# Patient Record
Sex: FEMALE | Race: ASIAN | NOT HISPANIC OR LATINO | Employment: FULL TIME | URBAN - METROPOLITAN AREA
[De-identification: names, ages, dates, MRNs, and addresses within clinical notes are randomized per-mention and may not be internally consistent; named-entity substitution may affect disease eponyms.]

---

## 2017-06-01 ENCOUNTER — OFFICE VISIT - HEALTHEAST (OUTPATIENT)
Dept: FAMILY MEDICINE | Facility: CLINIC | Age: 27
End: 2017-06-01

## 2017-06-01 DIAGNOSIS — F41.9 ANXIETY: ICD-10-CM

## 2017-06-01 DIAGNOSIS — Z00.00 HEALTHY ADULT ON ROUTINE PHYSICAL EXAMINATION: ICD-10-CM

## 2017-06-01 ASSESSMENT — MIFFLIN-ST. JEOR: SCORE: 1232.31

## 2017-06-02 ENCOUNTER — COMMUNICATION - HEALTHEAST (OUTPATIENT)
Dept: FAMILY MEDICINE | Facility: CLINIC | Age: 27
End: 2017-06-02

## 2017-06-12 LAB
BKR LAB AP ABNORMAL BLEEDING: NO
BKR LAB AP BIRTH CONTROL/HORMONES: ABNORMAL
BKR LAB AP CERVICAL APPEARANCE: NORMAL
BKR LAB AP GYN ADEQUACY: ABNORMAL
BKR LAB AP GYN INTERPRETATION: ABNORMAL
BKR LAB AP GYN OTHER FINDINGS: ABNORMAL
BKR LAB AP HPV REFLEX: ABNORMAL
BKR LAB AP LMP: ABNORMAL
BKR LAB AP PATIENT STATUS: ABNORMAL
BKR LAB AP PREVIOUS ABNORMAL: ABNORMAL
BKR LAB AP PREVIOUS NORMAL: ABNORMAL
HIGH RISK?: NO
PATH REPORT.COMMENTS IMP SPEC: ABNORMAL
RESULT FLAG (HE HISTORICAL CONVERSION): ABNORMAL

## 2017-06-15 LAB
HPV INTERPRETATION - HISTORICAL: ABNORMAL
HPV INTERPRETER - HISTORICAL: ABNORMAL

## 2017-06-21 ENCOUNTER — OFFICE VISIT - HEALTHEAST (OUTPATIENT)
Dept: FAMILY MEDICINE | Facility: CLINIC | Age: 27
End: 2017-06-21

## 2017-06-21 DIAGNOSIS — Z12.4 CERVICAL CANCER SCREENING: ICD-10-CM

## 2017-06-21 DIAGNOSIS — Z30.09 ENCOUNTER FOR OTHER GENERAL COUNSELING OR ADVICE ON CONTRACEPTION: ICD-10-CM

## 2017-06-21 DIAGNOSIS — R87.618 OTHER ABNORMAL CYTOLOGICAL FINDING OF SPECIMEN FROM CERVIX: ICD-10-CM

## 2017-06-21 ASSESSMENT — MIFFLIN-ST. JEOR: SCORE: 1224.37

## 2017-06-23 LAB
LAB AP CHARGES (HE HISTORICAL CONVERSION): NORMAL
PATH REPORT.COMMENTS IMP SPEC: NORMAL
PATH REPORT.COMMENTS IMP SPEC: NORMAL
PATH REPORT.FINAL DX SPEC: NORMAL
PATH REPORT.GROSS SPEC: NORMAL
PATH REPORT.MICROSCOPIC SPEC OTHER STN: NORMAL
PATH REPORT.RELEVANT HX SPEC: NORMAL
RESULT FLAG (HE HISTORICAL CONVERSION): NORMAL

## 2017-06-26 ENCOUNTER — COMMUNICATION - HEALTHEAST (OUTPATIENT)
Dept: ADMINISTRATIVE | Facility: CLINIC | Age: 27
End: 2017-06-26

## 2018-01-18 ENCOUNTER — COMMUNICATION - HEALTHEAST (OUTPATIENT)
Dept: FAMILY MEDICINE | Facility: CLINIC | Age: 28
End: 2018-01-18

## 2018-01-19 ENCOUNTER — OFFICE VISIT - HEALTHEAST (OUTPATIENT)
Dept: FAMILY MEDICINE | Facility: CLINIC | Age: 28
End: 2018-01-19

## 2018-01-19 DIAGNOSIS — Z3A.01 LESS THAN 8 WEEKS GESTATION OF PREGNANCY: ICD-10-CM

## 2018-01-19 DIAGNOSIS — Z23 NEED FOR IMMUNIZATION AGAINST INFLUENZA: ICD-10-CM

## 2018-01-19 LAB — HCG UR QL: POSITIVE

## 2018-01-19 ASSESSMENT — MIFFLIN-ST. JEOR: SCORE: 1230.99

## 2018-01-26 ENCOUNTER — COMMUNICATION - HEALTHEAST (OUTPATIENT)
Dept: ADMINISTRATIVE | Facility: CLINIC | Age: 28
End: 2018-01-26

## 2018-02-06 ENCOUNTER — COMMUNICATION - HEALTHEAST (OUTPATIENT)
Dept: FAMILY MEDICINE | Facility: CLINIC | Age: 28
End: 2018-02-06

## 2018-02-20 ENCOUNTER — COMMUNICATION - HEALTHEAST (OUTPATIENT)
Dept: FAMILY MEDICINE | Facility: CLINIC | Age: 28
End: 2018-02-20

## 2018-02-22 ENCOUNTER — AMBULATORY - HEALTHEAST (OUTPATIENT)
Dept: FAMILY MEDICINE | Facility: CLINIC | Age: 28
End: 2018-02-22

## 2018-02-22 DIAGNOSIS — O21.9 NAUSEA/VOMITING IN PREGNANCY: ICD-10-CM

## 2018-02-23 ENCOUNTER — COMMUNICATION - HEALTHEAST (OUTPATIENT)
Dept: FAMILY MEDICINE | Facility: CLINIC | Age: 28
End: 2018-02-23

## 2018-02-27 ENCOUNTER — PRENATAL OFFICE VISIT - HEALTHEAST (OUTPATIENT)
Dept: FAMILY MEDICINE | Facility: CLINIC | Age: 28
End: 2018-02-27

## 2018-02-27 DIAGNOSIS — Z34.90 PREGNANCY: ICD-10-CM

## 2018-02-27 DIAGNOSIS — Z34.81 ENCOUNTER FOR SUPERVISION OF OTHER NORMAL PREGNANCY IN FIRST TRIMESTER: ICD-10-CM

## 2018-02-27 LAB
ALBUMIN UR-MCNC: NEGATIVE MG/DL
AMPHETAMINES UR QL SCN: NORMAL
APPEARANCE UR: CLEAR
BARBITURATES UR QL: NORMAL
BASOPHILS # BLD AUTO: 0 THOU/UL (ref 0–0.2)
BASOPHILS NFR BLD AUTO: 0 % (ref 0–2)
BENZODIAZ UR QL: NORMAL
BILIRUB UR QL STRIP: NEGATIVE
CANNABINOIDS UR QL SCN: NORMAL
CLUE CELLS: ABNORMAL
COCAINE UR QL: NORMAL
COLOR UR AUTO: YELLOW
CREAT UR-MCNC: 86.2 MG/DL
EOSINOPHIL # BLD AUTO: 0.1 THOU/UL (ref 0–0.4)
EOSINOPHIL NFR BLD AUTO: 1 % (ref 0–6)
ERYTHROCYTE [DISTWIDTH] IN BLOOD BY AUTOMATED COUNT: 11.8 % (ref 11–14.5)
GLUCOSE UR STRIP-MCNC: NEGATIVE MG/DL
HCT VFR BLD AUTO: 36.5 % (ref 35–47)
HGB BLD-MCNC: 12.4 G/DL (ref 12–16)
HGB UR QL STRIP: NEGATIVE
HIV 1+2 AB+HIV1 P24 AG SERPL QL IA: NEGATIVE
KETONES UR STRIP-MCNC: NEGATIVE MG/DL
LEUKOCYTE ESTERASE UR QL STRIP: ABNORMAL
LYMPHOCYTES # BLD AUTO: 1.4 THOU/UL (ref 0.8–4.4)
LYMPHOCYTES NFR BLD AUTO: 16 % (ref 20–40)
MCH RBC QN AUTO: 31.6 PG (ref 27–34)
MCHC RBC AUTO-ENTMCNC: 34 G/DL (ref 32–36)
MCV RBC AUTO: 93 FL (ref 80–100)
MONOCYTES # BLD AUTO: 0.5 THOU/UL (ref 0–0.9)
MONOCYTES NFR BLD AUTO: 6 % (ref 2–10)
NEUTROPHILS # BLD AUTO: 6.8 THOU/UL (ref 2–7.7)
NEUTROPHILS NFR BLD AUTO: 77 % (ref 50–70)
NITRATE UR QL: NEGATIVE
OPIATES UR QL SCN: NORMAL
OXYCODONE UR QL: NORMAL
PCP UR QL SCN: NORMAL
PH UR STRIP: 7 [PH] (ref 5–8)
PLATELET # BLD AUTO: 160 THOU/UL (ref 140–440)
PMV BLD AUTO: 12.3 FL (ref 8.5–12.5)
RBC # BLD AUTO: 3.92 MILL/UL (ref 3.8–5.4)
SP GR UR STRIP: 1.01 (ref 1–1.03)
TRICHOMONAS, WET PREP: ABNORMAL
UROBILINOGEN UR STRIP-ACNC: ABNORMAL
WBC: 8.9 THOU/UL (ref 4–11)
YEAST, WET PREP: ABNORMAL

## 2018-02-27 ASSESSMENT — MIFFLIN-ST. JEOR: SCORE: 1213.03

## 2018-02-28 ENCOUNTER — COMMUNICATION - HEALTHEAST (OUTPATIENT)
Dept: FAMILY MEDICINE | Facility: CLINIC | Age: 28
End: 2018-02-28

## 2018-02-28 LAB
ABO/RH(D): NORMAL
ABORH REPEAT: NORMAL
ANTIBODY SCREEN: NEGATIVE
BACTERIA SPEC CULT: NO GROWTH
BKR LAB AP ABNORMAL BLEEDING: NO
BKR LAB AP BIRTH CONTROL/HORMONES: NORMAL
BKR LAB AP CERVICAL APPEARANCE: NORMAL
BKR LAB AP GYN ADEQUACY: NORMAL
BKR LAB AP GYN INTERPRETATION: NORMAL
BKR LAB AP HPV REFLEX: NORMAL
BKR LAB AP LMP: NORMAL
BKR LAB AP PATIENT STATUS: NORMAL
BKR LAB AP PREVIOUS ABNORMAL: NORMAL
BKR LAB AP PREVIOUS NORMAL: NORMAL
C TRACH DNA SPEC QL PROBE+SIG AMP: NEGATIVE
COLLECTION METHOD: NORMAL
GUARDIAN FIRST NAME: NORMAL
GUARDIAN LAST NAME: NORMAL
HBV SURFACE AG SERPL QL IA: NEGATIVE
HEALTH CARE PROVIDER CITY: NORMAL
HEALTH CARE PROVIDER NAME: NORMAL
HEALTH CARE PROVIDER PHONE: NORMAL
HEALTH CARE PROVIDER STATE: NORMAL
HEALTH CARE PROVIDER STREET ADDRESS: NORMAL
HEALTH CARE PROVIDER ZIP CODE: NORMAL
HIGH RISK?: NO
LEAD BLD-MCNC: NORMAL UG/DL
LEAD RETEST: NO
LEAD, B: <1 MCG/DL (ref 0–4.9)
N GONORRHOEA DNA SPEC QL NAA+PROBE: NEGATIVE
PATH REPORT.COMMENTS IMP SPEC: NORMAL
PATIENT CITY: NORMAL
PATIENT COUNTY: NORMAL
PATIENT EMPLOYER: NORMAL
PATIENT ETHNICITY: NORMAL
PATIENT HOME PHONE: NORMAL
PATIENT OCCUPATION: NORMAL
PATIENT RACE: NORMAL
PATIENT STATE: NORMAL
PATIENT STREET ADDRESS: NORMAL
PATIENT ZIP CODE: NORMAL
RESULT FLAG (HE HISTORICAL CONVERSION): NORMAL
RUBV IGG SERPL QL IA: POSITIVE
SUBMITTING LABORATORY PHONE: NORMAL
SYPHILIS RPR SCREEN - HISTORICAL: NORMAL
VENOUS/CAPILLARY: NORMAL

## 2018-03-01 ENCOUNTER — COMMUNICATION - HEALTHEAST (OUTPATIENT)
Dept: FAMILY MEDICINE | Facility: CLINIC | Age: 28
End: 2018-03-01

## 2018-03-14 ENCOUNTER — COMMUNICATION - HEALTHEAST (OUTPATIENT)
Dept: ADMINISTRATIVE | Facility: CLINIC | Age: 28
End: 2018-03-14

## 2018-03-27 ENCOUNTER — COMMUNICATION - HEALTHEAST (OUTPATIENT)
Dept: FAMILY MEDICINE | Facility: CLINIC | Age: 28
End: 2018-03-27

## 2018-08-03 ENCOUNTER — OFFICE VISIT - HEALTHEAST (OUTPATIENT)
Dept: FAMILY MEDICINE | Facility: CLINIC | Age: 28
End: 2018-08-03

## 2018-08-03 DIAGNOSIS — K64.5 THROMBOSED EXTERNAL HEMORRHOIDS: ICD-10-CM

## 2018-08-23 ENCOUNTER — RECORDS - HEALTHEAST (OUTPATIENT)
Dept: ADMINISTRATIVE | Facility: OTHER | Age: 28
End: 2018-08-23

## 2018-09-15 ENCOUNTER — HOSPITAL ENCOUNTER (OUTPATIENT)
Dept: OBGYN | Facility: HOSPITAL | Age: 28
Discharge: HOME | End: 2018-09-15
Attending: OBSTETRICS & GYNECOLOGY | Admitting: OBSTETRICS & GYNECOLOGY

## 2018-09-15 ASSESSMENT — MIFFLIN-ST. JEOR: SCORE: 1301.49

## 2018-09-16 ENCOUNTER — COMMUNICATION - HEALTHEAST (OUTPATIENT)
Dept: OBGYN | Facility: HOSPITAL | Age: 28
End: 2018-09-16

## 2018-09-16 ENCOUNTER — ANESTHESIA - HEALTHEAST (OUTPATIENT)
Dept: OBGYN | Facility: HOSPITAL | Age: 28
End: 2018-09-16

## 2019-07-11 ENCOUNTER — RECORDS - HEALTHEAST (OUTPATIENT)
Dept: ADMINISTRATIVE | Facility: OTHER | Age: 29
End: 2019-07-11

## 2019-10-07 ENCOUNTER — AMBULATORY - HEALTHEAST (OUTPATIENT)
Dept: MATERNAL FETAL MEDICINE | Facility: HOSPITAL | Age: 29
End: 2019-10-07

## 2019-10-07 DIAGNOSIS — O26.90 PREGNANCY, ANTEPARTUM, COMPLICATIONS: ICD-10-CM

## 2019-10-09 ENCOUNTER — AMBULATORY - HEALTHEAST (OUTPATIENT)
Dept: MATERNAL FETAL MEDICINE | Facility: HOSPITAL | Age: 29
End: 2019-10-09

## 2019-10-11 ENCOUNTER — RECORDS - HEALTHEAST (OUTPATIENT)
Dept: ULTRASOUND IMAGING | Facility: HOSPITAL | Age: 29
End: 2019-10-11

## 2019-10-11 ENCOUNTER — RECORDS - HEALTHEAST (OUTPATIENT)
Dept: ADMINISTRATIVE | Facility: OTHER | Age: 29
End: 2019-10-11

## 2019-10-11 ENCOUNTER — OFFICE VISIT - HEALTHEAST (OUTPATIENT)
Dept: MATERNAL FETAL MEDICINE | Facility: HOSPITAL | Age: 29
End: 2019-10-11

## 2019-10-11 DIAGNOSIS — O35.BXX0 ECHOGENIC FOCUS OF HEART OF FETUS AFFECTING ANTEPARTUM CARE OF MOTHER, SINGLE OR UNSPECIFIED FETUS: ICD-10-CM

## 2019-10-11 DIAGNOSIS — O26.90 PREGNANCY RELATED CONDITIONS, UNSPECIFIED, UNSPECIFIED TRIMESTER: ICD-10-CM

## 2019-11-24 ENCOUNTER — OFFICE VISIT - HEALTHEAST (OUTPATIENT)
Dept: FAMILY MEDICINE | Facility: CLINIC | Age: 29
End: 2019-11-24

## 2019-11-24 DIAGNOSIS — K64.5 THROMBOSED EXTERNAL HEMORRHOIDS: ICD-10-CM

## 2020-02-23 ENCOUNTER — ANESTHESIA - HEALTHEAST (OUTPATIENT)
Dept: OBGYN | Facility: HOSPITAL | Age: 30
End: 2020-02-23

## 2020-02-24 ENCOUNTER — HOME CARE/HOSPICE - HEALTHEAST (OUTPATIENT)
Dept: HOME HEALTH SERVICES | Facility: HOME HEALTH | Age: 30
End: 2020-02-24

## 2020-02-25 ENCOUNTER — HOME CARE/HOSPICE - HEALTHEAST (OUTPATIENT)
Dept: HOME HEALTH SERVICES | Facility: HOME HEALTH | Age: 30
End: 2020-02-25

## 2020-02-25 ENCOUNTER — COMMUNICATION - HEALTHEAST (OUTPATIENT)
Dept: HOME HEALTH SERVICES | Facility: HOME HEALTH | Age: 30
End: 2020-02-25

## 2020-06-16 ENCOUNTER — COMMUNICATION - HEALTHEAST (OUTPATIENT)
Dept: SCHEDULING | Facility: CLINIC | Age: 30
End: 2020-06-16

## 2021-02-17 ENCOUNTER — AMBULATORY - HEALTHEAST (OUTPATIENT)
Dept: PHARMACY | Facility: HOSPITAL | Age: 31
End: 2021-02-17

## 2021-05-31 VITALS — WEIGHT: 123.25 LBS | BODY MASS INDEX: 22.68 KG/M2 | HEIGHT: 62 IN

## 2021-05-31 VITALS — HEIGHT: 62 IN | BODY MASS INDEX: 23 KG/M2 | WEIGHT: 125 LBS

## 2021-05-31 VITALS — BODY MASS INDEX: 23.6 KG/M2 | HEIGHT: 61 IN | WEIGHT: 125 LBS

## 2021-06-01 VITALS — BODY MASS INDEX: 21.9 KG/M2 | HEIGHT: 62 IN | WEIGHT: 119 LBS

## 2021-06-01 VITALS — WEIGHT: 137.5 LBS | BODY MASS INDEX: 25.15 KG/M2

## 2021-06-02 VITALS — BODY MASS INDEX: 26.81 KG/M2 | WEIGHT: 142 LBS | HEIGHT: 61 IN

## 2021-06-02 NOTE — PROGRESS NOTES
"Please see \"Imaging\" tab under Chart Review for full report.  This ultrasound was performed in the [facility], and may be located under Care Everywhere.    Paty Freeman MD  Maternal Fetal Medicine    "

## 2021-06-03 NOTE — PROGRESS NOTES
Assessment:     1. Thrombosed external hemorrhoids            Plan:     Thrombosed external hemorrhoids incised today and several clots removed.  Patient did receive good relief of her symptoms.  The area was covered with a gauze bandage.  Encourage sitz bath's I continue with her stool softener and plenty of fluids.  Follow-up with her primary care provider or obstetrician if symptoms are not improving.    Subjective:       29 y.o. female at 27 weeks 4 days gestation with a history of external hemorrhoids in the past particularly with pregnancy presents for evaluation of a large painful external hemorrhoid.  She has been dealing with hemorrhoids for several weeks but starting yesterday it became extremely hard and more enlarged and very difficult to sit without extreme pain.  She has not taken anything topical for her symptoms.  She is on stool softeners daily and has been trying to push fluids.  She has been doing sitz baths.  She has had to have thrombosed hemorrhoids incised previously.    Patient Active Problem List   Diagnosis     Amenorrhea     Thrombosed External Hemorrhoids     Pregnancy     Normal Routine History And Physical - Postpartum     Hyperemesis Gravidarum     Gynecologic Services Intrauterine Device (IUD) Checking     Stye     Nausea/vomiting in pregnancy     Pregnant       Past Medical History:   Diagnosis Date     Abnormal Pap smear of cervix      Allergic      Gonorrhea      Migraine      Urinary tract infection      Varicella        Past Surgical History:   Procedure Laterality Date     BREAST BIOPSY       COLPOSCOPY         Current Outpatient Medications on File Prior to Visit   Medication Sig Dispense Refill     acetaminophen (TYLENOL) 325 MG tablet Take 1-2 tablets (325-650 mg total) by mouth every 4 (four) hours as needed.  0     ferrous gluconate (FERGON) 324 MG tablet Take 1 tablet by mouth 2 (two) times a day.  0     ibuprofen (ADVIL,MOTRIN) 800 MG tablet Take 1 tablet (800 mg total)  by mouth every 8 (eight) hours as needed for pain. 12 tablet 0     metoclopramide (REGLAN) 5 MG tablet TK 1 T PO 2 TIMES A DAY BEFORE MEALS  1     metroNIDAZOLE (FLAGYL) 500 MG tablet TAKE 1 TABLET BY MOUTH TWICE A DAY FOR 7 DAYS  0     multivit/iron/FA/K/herb no.244 (MULTIVIT-CALC-IRON-FA-K-HB#244 ORAL) Take by mouth.       No current facility-administered medications on file prior to visit.        Allergies   Allergen Reactions     Metronidazole Nausea And Vomiting       Family History   Problem Relation Age of Onset     Diabetes Maternal Grandmother      Kidney disease Mother      Asthma Brother      Hearing loss Brother        Social History     Socioeconomic History     Marital status: Single     Spouse name: Nik Chavarria     Number of children: 2     Years of education: None     Highest education level: None   Occupational History     Occupation:      Employer: [employer]   Social Needs     Financial resource strain: None     Food insecurity:     Worry: None     Inability: None     Transportation needs:     Medical: None     Non-medical: None   Tobacco Use     Smoking status: Never Smoker     Smokeless tobacco: Never Used     Tobacco comment:  smokes outside   Substance and Sexual Activity     Alcohol use: No     Drug use: No     Sexual activity: Yes     Partners: Male     Birth control/protection: None   Lifestyle     Physical activity:     Days per week: None     Minutes per session: None     Stress: None   Relationships     Social connections:     Talks on phone: None     Gets together: None     Attends [employer] service: None     Active member of club or organization: None     Attends meetings of clubs or organizations: None     Relationship status: None     Intimate partner violence:     Fear of current or ex partner: None     Emotionally abused: None     Physically abused: None     Forced sexual activity: None   Other Topics Concern     None   Social History Narrative      None         Review of Systems  A 12 point comprehensive review of systems was negative except as noted.      Objective:      BP 92/66   Pulse 90   Temp 98.2  F (36.8  C) (Oral)   Resp 14   Wt 145 lb 6.4 oz (66 kg)   LMP 05/08/2019   SpO2 98%   BMI 27.47 kg/m    General appearance: alert and Mildly uncomfortable.  Rectal: Patient has a large 3 cm x 2.5 cm firm and exquisitely tender external hemorrhoid noted.    Incision Thrombosed Hemorrhoid Procedure Note    Pre-operative Diagnosis: Thrombosed external hemorrhoid    Post-operative Diagnosis: same    Locations:external hemorrhoids    Indications: Painful external hemorrhoid. Explained surgical vs conservative treatment; surgical incision and drainage of clot usually works quickly to reduce pain and shorten healing time, but is uncomfortable to perform.  After discussion, the patient wishes to proceed with this procedure.    Anesthesia: 2 mL 1% lidocaine without epinephrine was injected into the dome of the hemorrhoid.    Procedure Details   After risks benefits were discussed for incision of thrombosed external hemorrhoid, verbal consent was obtained.  Hemorrhoid was iodine.  After adequate anesthesia, an elliptical incision was made, and the visible clot was extruded with curved hemostat. This was well tolerated. Bulky dressing is applied.    Complications:  none.    Plan:  1. Very frequent Sitz baths.  2. Colace tid prn and increase fluids to prevent constipation.    3. Call or return to clinic prn if these symptoms worsen or fail to improve as anticipated        This note has been dictated using voice recognition software. Any grammatical or context distortions are unintentional and inherent to the software

## 2021-06-04 VITALS
BODY MASS INDEX: 27.47 KG/M2 | DIASTOLIC BLOOD PRESSURE: 66 MMHG | OXYGEN SATURATION: 98 % | SYSTOLIC BLOOD PRESSURE: 92 MMHG | WEIGHT: 145.4 LBS | HEART RATE: 90 BPM | RESPIRATION RATE: 14 BRPM | TEMPERATURE: 98.2 F

## 2021-06-06 NOTE — ANESTHESIA PREPROCEDURE EVALUATION
Anesthesia Evaluation      Patient summary reviewed   No history of anesthetic complications     Airway   Mallampati: II  Neck ROM: full   Pulmonary - negative ROS and normal exam                          Cardiovascular - negative ROS and normal exam   Neuro/Psych      Comments: Hx of migraines    Endo/Other    (+) pregnant ()     GI/Hepatic/Renal - negative ROS           Dental - normal exam                        Anesthesia Plan  Planned anesthetic: epidural  Patient requesting labor epidural. Patient interviewed and examined at the bedside with RN present throughout. Discussed with patient the procedure of epidural placement, expectations, and risks including but not limited to: decreased blood pressure, poor analgesia possibly requiring replacement, post-dural puncture headache, bleeding, infection, nerve damage, and high spinal block. All questions answered. Patient consents to proceed with epidural placement.    ASA 2     Anesthetic plan and risks discussed with: patient and spouse    Post-op plan: routine recovery

## 2021-06-06 NOTE — ANESTHESIA POSTPROCEDURE EVALUATION
Patient: Jeremy Flores  Anesthesia type: epidural    Patient location: Labor and Delivery  Last vitals:   Pulse Readings from Last 1 Encounters:   02/23/20 77     SpO2 Readings from Last 1 Encounters:   02/23/20 97%     BP Readings from Last 1 Encounters:   02/23/20 94/53     Resp Readings from Last 1 Encounters:   02/23/20 16     Temp Readings from Last 1 Encounters:   02/23/20 36.8  C (98.3  F) (Oral)       Post vital signs: stable  Level of consciousness: awake and responds to simple questions  Post-anesthesia pain: pain controlled  Post-anesthesia nausea and vomiting: no  Pulmonary: unassisted, return to baseline  Cardiovascular: stable and blood pressure at baseline  Hydration: adequate  Anesthetic events: no    QCDR Measures:  ASA# 11 - Vesta-op Cardiac Arrest: ASA11B - Patient did NOT experience unanticipated cardiac arrest  ASA# 12 - Vesta-op Mortality Rate: ASA12B - Patient did NOT die  ASA# 13 - PACU Re-Intubation Rate: NA - No ETT / LMA used for case  ASA# 10 - Composite Anes Safety: ASA10A - No serious adverse event    Additional Notes:  Neuraxial block is wearing off appropriately. Patient ambulating and voiding without issue. Pain controlled. Denies headache or back pain. Epidural site appears clean and dry without evidence of erythema, ecchymoses or significant tenderness.

## 2021-06-06 NOTE — TELEPHONE ENCOUNTER
Hello,    Recently a referral was made for home care with [facility] for a one-time skilled nursing visit following delivery.    Patient was confirmed by the outreach nurse at the hospital to have a home visit on Wednesday 02/26/2020, but when our visit nurse called to schedule the time for the appointment, patient declined home visit.     She stated that she was going into the clinic today (02/25) and due to this, the home visit was not needed.    I will be cancelling our current order for homecare. If you have any questions or concerns, please call our office at [phone].     Thank you,    Zita Worrell  Patient   [facility]

## 2021-06-06 NOTE — ANESTHESIA PROCEDURE NOTES
Epidural Block    Patient location during procedure: OB  Time Called: 2/23/2020 2:34 AM  Reason for Block:labor epidural  Staffing:  Performing  Anesthesiologist: Mark Shell MD  Preanesthetic Checklist  Completed: patient identified, risks, benefits, and alternatives discussed, timeout performed, consent obtained, at patient's request, airway assessed, oxygen available, suction available, emergency drugs available and hand hygiene performed  Procedure  Patient position: sitting  Prep: ChloraPrep and site prepped and draped  Patient monitoring: continuous pulse oximetry, heart rate and blood pressure  Approach: midline  Location: L3-L4  Injection technique: JOSE saline  Number of Attempts:1  Needle  Needle type: Tuohy   Needle gauge: 17 G     Catheter in Space: 6  Assessment  Sensory level:  No complications      Additional Notes:  JOSE @ 6 cm, taped @ 12 cm

## 2021-06-08 NOTE — TELEPHONE ENCOUNTER
Pt is calling,    She is wanted to get tested   Exposed to family members that tested positive.    Symptoms started 2 days ago. This is day 3.  HA's that comes and goes.  No visual changes.  Sore throat  She does feel more tired than usual.  Dry cough  No fever  No body aches.  No shortness of breath, chest pain, or wheezing.    COVID 19 Nurse Triage Plan/Patient Instructions    Please be aware that novel coronavirus (COVID-19) may be circulating in the community. If you develop symptoms such as fever, cough, or SOB or if you have concerns about the presence of another infection including coronavirus (COVID-19), please contact your health care provider or visit [email or web address].     Disposition/Instructions    Patient to schedule a Virtual Visit with provider. Reference Visit Selection Guide.    Thank you for taking steps to prevent the spread of this virus.  o Limit your contact with others.  o Wear a simple mask to cover your cough.  o Wash your hands well and often.    Resources    [facility]: About COVID-19: [email or web address]    CDC: What to Do If You're Sick: www.cdc.gov/coronavirus/2019-ncov/about/steps-when-sick.html    CDC: Ending Home Isolation: www.cdc.gov/coronavirus/2019-ncov/hcp/disposition-in-home-patients.html     CDC: Caring for Someone: www.cdc.gov/coronavirus/2019-ncov/if-you-are-sick/care-for-someone.html     [facility]: Interim Guidance for Hospital Discharge to Home: www.health.[facility].[place]/diseases/coronavirus/hcp/hospdischarge.pdf    [facility] clinical trials (COVID-19 research studies): [email or web address]     Below are the COVID-19 hotlines at the [place] Department of Health ([facility]). Interpreters are available.   o For health questions: Call 842-073-0018 or 1-123.584.2735 (7 a.m. to 7 p.m.)  o For questions about schools and childcare: Call 585-474-4698 or 1-517.590.3988 (7 a.m. to 7 p.m.)       Reason for Disposition    [1] COVID-19  infection suspected by caller or triager AND [2] mild symptoms (cough, fever, or others) AND [3] no complications or SOB    Additional Information    Negative: SEVERE difficulty breathing (e.g., struggling for each breath, speaks in single words)    Negative: Difficult to awaken or acting confused (e.g., disoriented, slurred speech)    Negative: Bluish (or gray) lips or face now    Negative: Shock suspected (e.g., cold/pale/clammy skin, too weak to stand, low BP, rapid pulse)    Negative: Sounds like a life-threatening emergency to the triager    Negative: [1] COVID-19 exposure AND [2] no symptoms    Negative: COVID-19 and Breastfeeding, questions about    Negative: [1] Adult with possible COVID-19 symptoms AND [2] triager concerned about severity of symptoms or other causes    Negative: SEVERE or constant chest pain or pressure (Exception: mild central chest pain, present only when coughing)    Negative: MODERATE difficulty breathing (e.g., speaks in phrases, SOB even at rest, pulse 100-120)    Negative: Patient sounds very sick or weak to the triager    Negative: MILD difficulty breathing (e.g., minimal/no SOB at rest, SOB with walking, pulse <100)    Negative: Chest pain or pressure    Negative: Fever > 103 F (39.4 C)    Negative: [1] Fever > 101 F (38.3 C) AND [2] age > 60    Negative: [1] Fever > 100.0 F (37.8 C) AND [2] bedridden (e.g., nursing home patient, CVA, chronic illness, recovering from surgery)    Negative: HIGH RISK patient (e.g., age > 64 years, diabetes, heart or lung disease, weak immune system)    Negative: Fever present > 3 days (72 hours)    Negative: [1] Fever returns after gone for over 24 hours AND [2] symptoms worse or not improved    Negative: [1] Continuous (nonstop) coughing interferes with work or school AND [2] no improvement using cough treatment per protocol    Protocols used: CORONAVIRUS (COVID-19) DIAGNOSED OR [id number]-TV 5.16.20    Protocol and home care reviewed with the  patient.  I advised her to  Schedule an appointment through [email or web address] today.   Call back with any new or worsening signs, symptoms, concerns, or questions.  She verbalized understanding.    Criss Gonzales RN   [facility] Nurse Advisor

## 2021-06-11 NOTE — PROGRESS NOTES
Assessment:      Healthy female exam.    1. Anxiety  She has contacted a counselor, and will be trying to get in to see one  Discussed returning for meds if anxiety worsens, or if she has si/hi.    I did give her a prescription for trazodone to help with her insomnia.  We also discussed good sleep hygiene.  I advised her to limit caffeine.  I encouraged her to get regular physical activity.  She will follow-up if any of her symptoms are worsening.  - traZODone (DESYREL) 50 MG tablet; Take 1 tablet (50 mg total) by mouth at bedtime.  Dispense: 30 tablet; Refill: 12  - Thyroid McPherson    2. Healthy adult on routine physical examination  Follow-up for repeat Pap in 2 years as long as this current Pap smear is normal.  - Chlamydia trachomatis & Neisseria gonorrhoeae, Amplified Detection  - Wet Prep, Vaginal  - Gynecologic Cytology (PAP Smear)      Subjective:      Jeermy Flores is a 26 y.o. female who presents for an annual exam. The patient is sexually active. The patient participates in regular exercise: yes. The patient reports that there is not domestic violence in her life.     Healthy Habits:   Regular Exercise: Yes  Sunscreen Use: Yes  Healthy Diet: Yes  Dental Visits Regularly: Yes  Seat Belt: Yes  Sexually active: Yes  Self Breast Exam Monthly:No  Hemoccults: No  Flex Sig: No  Colonoscopy: No  Lipid Profile: N/A  Glucose Screen: Yes  Prevention of Osteoporosis: Yes  Last Dexa: N/A  Guns at Home:  No      Immunization History   Administered Date(s) Administered     HPV Quadrivalent 02/07/2012, 07/25/2013, 02/26/2015     Hep A, Adult 07/25/2013     Hep A, historic 02/07/2012     Hep B, Adult 02/26/2015     Hep B, historic 02/07/2012, 07/25/2013     Influenza, inj, historic 11/14/2011     Influenza, seasonal,quad inj 6-35 mos 02/26/2015     Meningococcal MCV4P 02/07/2012     Tdap 02/07/2012, 04/12/2013     Typhoid Live 04/17/2015     Immunization status: up to date and documented.    No exam data present    Gynecologic  History  Patient's last menstrual period was 05/10/2017 (within days).  Contraception: IUD  Last Pap: 2/26/15. Results were: normal  Last mammogram: n/a. Results were: n/a      OB History    Para Term  AB Living   3 2 2 0 1 2   SAB TAB Ectopic Multiple Live Births   1 0 0 0       # Outcome Date GA Lbr Jose M/2nd Weight Sex Delivery Anes PTL Lv   3 Term 13 40w0d  7 lb 8 oz (3.402 kg) F Vag-Spont EPI N CLARI   2 Term 11 40w0d  7 lb 7 oz (3.374 kg) F Vag-Spont EPI N CLARI   1 SAB  2w0d                 Current Outpatient Prescriptions   Medication Sig Dispense Refill     levonorgestrel (MIRENA) 20 mcg/24 hr (5 years) IUD LOT [id number]  EXP 2015       DOC-Q-LACE 100 mg capsule Take one capsule by mouth twice daily 60 capsule 2     docusate sodium 100 mg capsule Take 100 mg by mouth 2 (two) times a day.       traZODone (DESYREL) 50 MG tablet Take 1 tablet (50 mg total) by mouth at bedtime. 30 tablet 12     No current facility-administered medications for this visit.      No past medical history on file.  No past surgical history on file.  Metronidazole  Family History   Problem Relation Age of Onset     Diabetes Maternal Grandmother      Kidney disease Mother      Social History     Social History     Marital status: Single     Spouse name: Nik Chavarria     Number of children: 2     Years of education: N/A     Occupational History      [employer]     Social History Main Topics     Smoking status: Passive Smoke Exposure - Never Smoker     Smokeless tobacco: Never Used     Alcohol use 1.8 oz/week     1 Glasses of wine, 1 Cans of beer, 1 Shots of liquor per week     Drug use: No     Sexual activity: Yes     Partners: Male     Birth control/ protection: IUD     Other Topics Concern     Not on file     Social History Narrative       Review of Systems  General:  Denies problem  Eyes: Denies problem  Ears/Nose/Throat: Denies problem  Cardiovascular: Denies problem  Respiratory:   "Denies problem  Gastrointestinal:  Denies problem, Genitourinary: Denies problem  Musculoskeletal:  Denies problem  Skin: Denies problem  Neurologic: Denies problem  Psychiatric: Denies problem  Endocrine: Denies problem  Heme/Lymphatic: Denies problem   Allergic/Immunologic: Denies problem        Objective:         Vitals:    06/01/17 1406   BP: (!) 84/60   Pulse: 62   Resp: 16   Temp: 97.9  F (36.6  C)   TempSrc: Oral   SpO2: 96%   Weight: 125 lb (56.7 kg)   Height: 5' 1.5\" (1.562 m)     Body mass index is 23.24 kg/(m^2).    Physical Exam:  General Appearance: Alert, cooperative, no distress, appears stated age  Head: Normocephalic, without obvious abnormality, atraumatic  Eyes: PERRL, conjunctiva/corneas clear, EOM's intact  Ears: Normal TM's and external ear canals, both ears  Nose: Nares normal, septum midline,mucosa normal, no drainage  Throat: Lips, mucosa, and tongue normal; teeth and gums normal  Neck: Supple, symmetrical, trachea midline, no adenopathy;  thyroid: not enlarged, symmetric, no tenderness/mass/nodules; no carotid bruit or JVD  Back: Symmetric, no curvature, ROM normal, no CVA tenderness  Lungs: Clear to auscultation bilaterally, respirations unlabored  Breasts: No breast masses, tenderness, asymmetry, or nipple discharge.  Heart: Regular rate and rhythm, S1 and S2 normal, no murmur, rub, or gallop, Abdomen: Soft, non-tender, bowel sounds active all four quadrants,  no masses, no organomegaly  Pelvic:Normally developed genitalia with no external lesions or eruptions. Vagina and cervix show no lesions, inflammation, discharge or tenderness. No cystocele, No rectocele. Uterus midline.  iud strings visible at cervical os.  .  No adnexal mass or tenderness.    Extremities: Extremities normal, atraumatic, no cyanosis or edema  Skin: Skin color, texture, turgor normal, no rashes or lesions  Lymph nodes: Cervical, supraclavicular, and axillary nodes normal  Neurologic: Normal        "

## 2021-06-11 NOTE — PROGRESS NOTES
Assessment/Plan:        Diagnoses and all orders for this visit:    Other abnormal cytological finding of specimen from cervix- I explained to pt that we can not tell who she got HPV from and when she got it.  She did not have HPV testing done with her last Pap because the Pap was normal and guidelines were different then. I also reassured her that cervical cancer takes decades to develop and that the most important thing she can do it follow the recommendations we give her as far as testing and procedures go.  I will notify her when I have the bx results, explained that she will need Paps and or colps more often than usual for at least a year.  She was relieved to have this information.     I instructed her to put nothing in her vagina for the next 7 days to allow cervical healing and that she should notify us if she gets a foul odor or fever.    -     Surgical Pathology Exam    Cervical cancer screening  -     Pregnancy, Urine    Encounter for other general counseling or advice on contraception- I accidentally partially removed her Mirena during a cervical bx.  SHe was thinking about getting it out- it has been in for 4 years and she has had IUD's for a long time.  Would like to see what her body is like without added hormones.  We discussed contraception- I explained that she has no protection as of right now.  She wants to use condoms and we gave her some.            Subjective:    Patient ID: Jeremy Flores is a 26 y.o. female.    HPI  Here for a colposcopy- she is very worried because she thinks she might have cancer and will die soon.  SHe has a 4 yr old and 6 yr old and wonders what will happen to them if she dies.  Read about abnormal paps on line and that made her worry even more.  Is having her period now.  Has been with the same man for 16 years and is wondering when she got HPV and whether or not he needs to be tested.       The following portions of the patient's history were reviewed and updated as  appropriate: allergies, current medications, past family history, past medical history, past social history, past surgical history and problem list.    Review of Systems      10 sys rev neg.     Objective:    Physical Exam        .Procedure Note:  Risks and benefits of procedure reviewed with patient, consent signed.  With pt in dorsal lithotomy position, sterile speculum inserted into vagina.  Cervix and vagina examined through colposcope. Vagina appears healthy.  Acetic acid applied to cervix, cervix again visualized through coloscope.  Entire SCJ visualized.  Entire cervix inflamed with increased vascularity.  Biopsies obtained from   12 o'clock and 7 o'clock  Monsel's solution applied, good hemostasis achieved. IUD partially removed when I accidentally caught the string in the biopsy forceps. I then completely removed it with a ring forceps without complications. Pt . tolerated procedure well.

## 2021-06-15 NOTE — PROGRESS NOTES
"Chief Complaint   Patient presents with     pregnancy confirmation     LMP        HPI:  Jeremy Flores is a 27 y.o. female   presents requesting pregnancy test.  Patient's last menstrual period was 2018 (exact date).   Normal: none  Contraception: IUD removed 6-7 months ago  Home Pregnancy Test:  18--negative; 18--positive    Last Pregnancy: 2013;  Complicated by hyperemesis  Symptoms: breast tenderness this week    Tobacco use: none  ETOH use:  none    Planned: yes    MEDICATIONS:  Current Outpatient Prescriptions on File Prior to Visit   Medication Sig     [DISCONTINUED] DOC-Q-LACE 100 mg capsule Take one capsule by mouth twice daily     [DISCONTINUED] docusate sodium 100 mg capsule Take 100 mg by mouth 2 (two) times a day.     No current facility-administered medications on file prior to visit.          ALLERGIES:  Allergies   Allergen Reactions     Metronidazole Nausea And Vomiting       SOCIAL HISTORY:  History   Smoking Status     Passive Smoke Exposure - Never Smoker   Smokeless Tobacco     Never Used     Comment:  smokes outside         EXAM:  Vitals:    18 1543 18 1544   BP: (!) 80/60 92/62   Pulse: 62    Resp: 16    Temp: 97.4  F (36.3  C)    TempSrc: Oral    Weight: 125 lb (56.7 kg)    Height: 5' 1.42\" (1.56 m)        GEN:  NAD  ABD:  FUNDUS: not palpable    FHT's by doptone: none    LABS:  Results for orders placed or performed in visit on 18   Pregnancy (Beta-hCG, Qual), Urine   Result Value Ref Range    Pregnancy Test, Urine Positive (!) Negative       ASSESSMENT/PLAN:    1. Missed period  Pregnancy (Beta-hCG, Qual), Urine      Dating by LMP:  EGA: 6+2 weeks    EDC: 2018    Start prenatal vitamins.  Healthy diet  Small, frequent meals if nauseated.  No medications other than tylenol unless okayed by doctor  Notify clinic if bleeding or pain.  Set up 1st OB visit.     The following portions of the patient's history were reviewed and updated as " appropriate: allergies, current medications, past family history, past medical history, past social history, past surgical history and problem list.         Kalia Cervantes MD   1/19/2018

## 2021-06-16 PROBLEM — Z34.90 PREGNANT: Status: ACTIVE | Noted: 2018-09-16

## 2021-06-16 PROBLEM — O21.9 NAUSEA/VOMITING IN PREGNANCY: Status: ACTIVE | Noted: 2018-02-04

## 2021-06-16 NOTE — PROGRESS NOTES
PRENATAL VISIT   FIRST OBSTETRICAL EXAM - OB    Assessment / Impression     History of abnormal pap smear:  Did not return for repeat colposcopy.    Normal first prenatal visit at 11w6d  Discussed orientation, general information, lifestyle, nutrition, exercise,warning signs, resources, lab testing, risk screening and discussed cystic fibrosis screening with patient.  Questions answered.    Plan:     Pap repeated today.     Initial labs drawn.  Prenatal vitamins.  Problem list reviewed and updated.  Genetic screening test options discussed:  Patient elects uncertain.  she took information to review today.  Role of ultrasound in pregnancy discussed; fetal survey: requested.  Follow up: Return in 4 weeks (on 3/27/2018).      Subjective:    Jeremy Flores is a 27 y.o.  here today for her First Obstetrical Exam.   OB History    Para Term  AB Living   4 2 2 0 1 2   SAB TAB Ectopic Multiple Live Births   1 0 0 0 2      # Outcome Date GA Lbr Jose M/2nd Weight Sex Delivery Anes PTL Lv   4 Current            3 Term 13 40w0d  7 lb 8 oz (3.402 kg) F Vag-Spont EPI N CLARI   2 Term 11 40w0d  7 lb 7 oz (3.374 kg) F Vag-Spont EPI N CLARI   1 SAB  2w0d                 Expected Date of Delivery: 2018, by Last Menstrual Period    Past Medical History:   Diagnosis Date     Abnormal Pap smear of cervix      Allergic      Gonorrhea      Migraine      Urinary tract infection      Varicella      Past Surgical History:   Procedure Laterality Date     BREAST BIOPSY       COLPOSCOPY       Social History   Substance Use Topics     Smoking status: Passive Smoke Exposure - Never Smoker     Smokeless tobacco: Never Used      Comment:  smokes outside     Alcohol use 1.8 oz/week     1 Glasses of wine, 1 Cans of beer, 1 Shots of liquor per week     Current Outpatient Prescriptions   Medication Sig Dispense Refill     prenat.vits,joseph,min-iron-folic (PRENATAL VITAMIN) Tab Take 1 tablet by mouth daily. 90 each 3      "pyridoxine, vitamin B6, (VITAMIN B-6) 50 MG tablet Take 1 tablet (50 mg total) by mouth daily. 90 tablet 2     doxylamine (UNISOM, DOXYLAMINE,) 25 mg tablet Take 1 tablet (25 mg total) by mouth at bedtime. 90 tablet 0     No current facility-administered medications for this visit.      Allergies   Allergen Reactions     Metronidazole Nausea And Vomiting             High Risk Behavior: None    Review of Systems  General:  Denies problem  Eyes: Denies problem  Ears/Nose/Throat: Denies problem  Cardiovascular: Denies problem  Respiratory:  Denies problem  Gastrointestinal:  Denies problem, Genitourinary: Denies problem  Musculoskeletal:  Denies problem  Skin: Denies problem  Neurologic: Denies problem  Psychiatric: Denies problem  Endocrine: Denies problem  Heme/Lymphatic: Denies problem   Allergic/Immunologic: Denies problem       Objective:   Objective    Vitals:    02/27/18 1354   BP: 92/54   Pulse: 84   Resp: 12   Temp: 97.8  F (36.6  C)   TempSrc: Oral   Weight: 119 lb (54 kg)   Height: 5' 2\" (1.575 m)     Physical Exam:  General Appearance: Alert, cooperative, no distress, appears stated age  Head: Normocephalic, without obvious abnormality, atraumatic  Eyes: PERRL, conjunctiva/corneas clear, EOM's intact  Ears: Normal TM's and external ear canals, both ears  Nose: Nares normal, septum midline,mucosa normal, no drainage  Throat: Lips, mucosa, and tongue normal; teeth and gums normal  Neck: Supple, symmetrical, trachea midline, no adenopathy;  thyroid: not enlarged, symmetric, no tenderness/mass/nodules; no carotid bruit or JVD  Back: Symmetric, no curvature, ROM normal, no CVA tenderness  Lungs: Clear to auscultation bilaterally, respirations unlabored  Breasts: No breast masses, tenderness, asymmetry, or nipple discharge.  Heart: Regular rate and rhythm, S1 and S2 normal, no murmur, rub, or gallop, Abdomen: Soft, non-tender, bowel sounds active all four quadrants,  no masses, no organomegaly  Pelvic:Normally " developed genitalia with no external lesions or eruptions. Vagina and cervix show no lesions, inflammation, discharge or tenderness. No cystocele, No rectocele.  No adnexal mass or tenderness.    Extremities: Extremities normal, atraumatic, no cyanosis or edema  Skin: Skin color, texture, turgor normal, no rashes or lesions  Lymph nodes: Cervical, supraclavicular, and axillary nodes normal  Neurologic: Normal     Lab:   Results for orders placed or performed in visit on 01/19/18   Pregnancy (Beta-hCG, Qual), Urine   Result Value Ref Range    Pregnancy Test, Urine Positive (!) Negative

## 2021-06-17 NOTE — PATIENT INSTRUCTIONS - HE
Patient Instructions by Meagan Esparza MD at 11/24/2019 11:20 AM     Author: Meagan Esparza MD Service: -- Author Type: Physician    Filed: 11/24/2019 11:49 AM Encounter Date: 11/24/2019 Status: Signed    : Meagan Esparza MD (Physician)         Patient Education     Thrombosed Hemorrhoids    Hemorrhoids are swollen veins in the lower rectum and anus. They're similar to varicose veins that form in the legs. Hemorrhoids can happen inside the rectum (internal hemorrhoids). Or one may form at the anal opening (external hemorrhoids). They may bleed, but most hemorrhoids aren't cause for concern. But a small blood clot (thrombus) can develop in an external hemorrhoid. This may lead to severe pain and sometimes bleeding.  When to go to the emergency room (ER)  If you have severe pain or excessive bleeding, seek medical care right away.  What to expect in the ER  A healthcare provider is likely to check your anus and rectum using a thin, lighted tube (anoscope or proctoscope). You will get a local pain reliever (anesthetic) to ease any mild pain.  Treatment  Treatment recommendations include:    If the blood clot has formed within the past 48 to 72 hours, your healthcare provider may remove it from within the hemorrhoid. This is a simple procedure that can ease pain. You will have a local anesthetic to keep you pain-free during the procedure. The provider makes a small cut (incision) in the skin and removes the blood clot. Stitches are generally not needed.    If more than 72 hours have passed, your healthcare provider will suggest home treatments. Simple home treatments can ease your pain. These may include warm baths, ointments, suppositories, and witch hazel compresses. Many thrombosed hemorrhoids go away on their own in a few weeks.    If you have bleeding that continues or painful hemorrhoids, talk with your healthcare provider. Possible treatment may include banding, ligation, or removal  (hemorrhoidectomy).  Tips for preventing hemorrhoids  Tips include:    Eat foods that are high in fiber and use fiber supplements to help prevent constipation.    Drink plenty of liquids.    Get regular exercise to help prevent constipation and promote good bowel function.  Date Last Reviewed: 8/1/2018 2000-2019 [employer]. [address] [place] [address], [place] [address]. All rights reserved. This information is not intended as a substitute for professional medical care. Always follow your healthcare professional's instructions.           Patient Education     Treating Hemorrhoids: Self-Care    Follow your healthcare providers advice about caring for your hemorrhoids at home. Some treatments help relieve symptoms right away. Others involve making changes in your diet and exercise habits. These can help ease constipation and prevent hemorrhoids from coming back.  Relieving symptoms  Your healthcare provider may prescribe anti-inflammatory medicine to help ease your symptoms. These tips will also help relieve pain and swelling.    Take sitz baths. Taking a sitz bath means sitting in a few inches of warm bath water. Soaking for 10 minutes twice a day can provide relief from painful hemorrhoids. It can also help the area stay clean.    Develop good bowel habits. Use the bathroom when you need to. Dont ignore the urge to move your bowels. This can lead to constipation, hard stools, and straining. Also, dont read while on the toilet. Sit only as long as needed. Wipe gently with soft, unscented toilet tissue or baby wipes.    Use ice packs. Placing an ice pack on an external hemorrhoid can help relieve pain right away. It will also help reduce the blood clot. Use the ice for 15 to 20 minutes at a time. Keep a cloth between the ice and your skin to prevent skin damage.    Use other measures. Laxatives and enemas can help ease constipation. But use them only on your healthcare providers advice. For symptom  relief, try using cotton pads soaked in witch hazel. These are available at most drugstores. Over-the-counter hemorrhoid ointments and petroleum jelly can also provide relief.  Add fiber to your diet  Adding fiber to your diet can help relieve constipation by making stools softer and easier to pass. To increase your fiber intake, your healthcare provider may recommend a bulking agent, such as psyllium. This is a high-fiber supplement available at most grocery stores and drugstores. Eating more fiber-rich foods will also help. There are two types of fiber:    Insoluble fiber is the main ingredient in bulking agents. Its also found in foods such as wheat bran, whole-grain breads, fresh fruits, and vegetables.    Soluble fiber is found in foods such as oat bran. Although soluble fiber is good for you, it may not ease constipation as much as foods high in insoluble fiber.  Drink more water  Along with a high-fiber diet, drinking more water can help ease constipation. This is because insoluble fiber absorbs water, making stools soft and bulky. Be sure to drink plenty of water throughout the day. Drinking fruit juices, such as prune juice or apple juice, can also help prevent constipation.  Get more exercise  Regular exercise aids digestion and helps prevent constipation. Its also great for your health. So talk with your healthcare provider about starting an exercise program. Low-impact activities, such as swimming or walking, are good places to start. Take it easy at first. And remember to drink plenty of water when you exercise.  High-fiber foods Easy ways to add fiber  High-fiber foods offer many benefits. By making your stools softer, they help heal and prevent swollen hemorrhoids. They may also help reduce the risk of colon and rectal cancer. Best of all, theyre usually low in calories and taste great. Here are some examples of fiber-rich foods.    Whole grains, such as wheat bran, corn bran, and brown  rice    Vegetables, especially carrots, broccoli, cabbage, and peas    Fruits, such as apples, bananas, raisins, peaches, prunes, and pears    Nuts and legumes, especially peanuts, lentils, and kidney beans  Easy ways to add fiber  The tips below offer some simple ways to add more high-fiber foods to your meals.    Start your day with a high-fiber breakfast. Eat a wheat bran cereal along with a sliced banana. Or, try peanut butter on whole-wheat toast.    Eat carrot sticks for snacks. Theyre easy to prepare, taste great, and are low in calories.    Use whole-grain breads instead of white bread for sandwiches.    Eat fruits for treats. Try an apple and some raisins instead of a candy bar.  Date Last Reviewed: 7/1/2016 2000-2019 [employer]. [address] [place] [address]. All rights reserved. This information is not intended as a substitute for professional medical care. Always follow your healthcare professional's instructions.           Patient Education     Treating Hemorrhoids: Self-Care    Follow your healthcare providers advice about caring for your hemorrhoids at home. Some treatments help relieve symptoms right away. Others involve making changes in your diet and exercise habits. These can help ease constipation and prevent hemorrhoids from coming back.  Relieving symptoms  Your healthcare provider may prescribe anti-inflammatory medicine to help ease your symptoms. These tips will also help relieve pain and swelling.    Take sitz baths. Taking a sitz bath means sitting in a few inches of warm bath water. Soaking for 10 minutes twice a day can provide relief from painful hemorrhoids. It can also help the area stay clean.    Develop good bowel habits. Use the bathroom when you need to. Dont ignore the urge to move your bowels. This can lead to constipation, hard stools, and straining. Also, dont read while on the toilet. Sit only as long as needed. Wipe gently with soft, unscented  toilet tissue or baby wipes.    Use ice packs. Placing an ice pack on an external hemorrhoid can help relieve pain right away. It will also help reduce the blood clot. Use the ice for 15 to 20 minutes at a time. Keep a cloth between the ice and your skin to prevent skin damage.    Use other measures. Laxatives and enemas can help ease constipation. But use them only on your healthcare providers advice. For symptom relief, try using cotton pads soaked in witch hazel. These are available at most drugstores. Over-the-counter hemorrhoid ointments and petroleum jelly can also provide relief.  Add fiber to your diet  Adding fiber to your diet can help relieve constipation by making stools softer and easier to pass. To increase your fiber intake, your healthcare provider may recommend a bulking agent, such as psyllium. This is a high-fiber supplement available at most grocery stores and drugstores. Eating more fiber-rich foods will also help. There are two types of fiber:    Insoluble fiber is the main ingredient in bulking agents. Its also found in foods such as wheat bran, whole-grain breads, fresh fruits, and vegetables.    Soluble fiber is found in foods such as oat bran. Although soluble fiber is good for you, it may not ease constipation as much as foods high in insoluble fiber.  Drink more water  Along with a high-fiber diet, drinking more water can help ease constipation. This is because insoluble fiber absorbs water, making stools soft and bulky. Be sure to drink plenty of water throughout the day. Drinking fruit juices, such as prune juice or apple juice, can also help prevent constipation.  Get more exercise  Regular exercise aids digestion and helps prevent constipation. Its also great for your health. So talk with your healthcare provider about starting an exercise program. Low-impact activities, such as swimming or walking, are good places to start. Take it easy at first. And remember to drink plenty of water  when you exercise.  High-fiber foods Easy ways to add fiber  High-fiber foods offer many benefits. By making your stools softer, they help heal and prevent swollen hemorrhoids. They may also help reduce the risk of colon and rectal cancer. Best of all, theyre usually low in calories and taste great. Here are some examples of fiber-rich foods.    Whole grains, such as wheat bran, corn bran, and brown rice    Vegetables, especially carrots, broccoli, cabbage, and peas    Fruits, such as apples, bananas, raisins, peaches, prunes, and pears    Nuts and legumes, especially peanuts, lentils, and kidney beans  Easy ways to add fiber  The tips below offer some simple ways to add more high-fiber foods to your meals.    Start your day with a high-fiber breakfast. Eat a wheat bran cereal along with a sliced banana. Or, try peanut butter on whole-wheat toast.    Eat carrot sticks for snacks. Theyre easy to prepare, taste great, and are low in calories.    Use whole-grain breads instead of white bread for sandwiches.    Eat fruits for treats. Try an apple and some raisins instead of a candy bar.  Date Last Reviewed: 7/1/2016 2000-2019 [employer]. [address] [place] [address], [place] [address]. All rights reserved. This information is not intended as a substitute for professional medical care. Always follow your healthcare professional's instructions.

## 2021-06-19 NOTE — PROGRESS NOTES
"   Assessment and Plan     Jeremy was seen today for having issue with thrombo hemerrhoid.  out x 4-5 days.    Diagnoses and all orders for this visit:    Thrombosed external hemorrhoids         HPI     Chief Complaint   Patient presents with     having issue with thrombo hemerrhoid.  out x 4-5 days      is wondering if it can be \"taken care of\" today as she has now missed work because of the pain.       Jeremy Flores is a 27 y.o. female seen today for hemorrhoid pain.  She is pregnant and has had similar hemorrhoids with each of her previous pregnancies.  She has had a palpable hemorrhoid that has been extremely tender for the last 3-4 days.  She denies rectal bleeding.  No fever or chills.       Current Outpatient Prescriptions:      multivit,calc,mins/iron/folic (ONE-A-DAY WOMENS FORMULA ORAL), Take 1 tablet by mouth daily., Disp: , Rfl:      doxylamine (UNISOM, DOXYLAMINE,) 25 mg tablet, Take 1 tablet (25 mg total) by mouth at bedtime., Disp: 90 tablet, Rfl: 0     prenat.vits,joseph,min-iron-folic (PRENATAL VITAMIN) Tab, Take 1 tablet by mouth daily., Disp: 90 each, Rfl: 3     pyridoxine, vitamin B6, (VITAMIN B-6) 50 MG tablet, Take 1 tablet (50 mg total) by mouth daily., Disp: 90 tablet, Rfl: 2     Reviewed and updated: medical history, medications and allergies.     Review of Systems     General: Denies fever, chills, fatigue.  GI: Denies nausea, vomiting, diarrhea, constipation.  Acknowledges perirectal pain, she is pretty sure she has a hemorrhoid.     Objective     Vitals:    08/03/18 1052   BP: 116/74   Patient Site: Right Arm   Patient Position: Sitting   Cuff Size: Adult Regular   Pulse: 88   Resp: 16   Temp: 98.7  F (37.1  C)   TempSrc: Oral   SpO2: 98%   Weight: 137 lb 8 oz (62.4 kg)        Reviewed vital signs.  General: Appears calm, comfortable. Answers questions quickly and appropriately with clear speech. No apparent distress.  Skin: Pink, warm, dry.  HENT: Normocephalic, atraumatic.  Neck: " Supple.  Cardiovascular: Strong, regular radial pulse.  Respiratory: Normal respiratory effort.  Neuro: Memory and cognition appear normal. Normal gait.  Psych: Mood and affect appear normal.   : Rectal exam reveals a thrombosed external hemorrhoid at what would be the 9 o'clock position if she were supine.  No other hemorrhoids.  No prolapsed internal hemorrhoids noted.  Imaging:   No results found.    Labs:  No results found for this or any previous visit (from the past 24 hour(s)).     Medical Decision-Making     Jeremy is well-appearing 27-year-old female who is pregnant.  She has a thrombosed external hemorrhoid is been extremely painful for the last 2-3 days.  No other external hemorrhoids noted.  No evidence of internal hemorrhoids, although since she is not having symptoms of internal hemorrhoids including internal pain or rectal bleeding, anoscopy was not performed.  The hemorrhoid was excised as described below in the procedure note.  Procedure was well-tolerated.  There was no postprocedure bleeding.  She will follow-up with her PCP early next week for a wound check.    Reviewed red flags that would trigger a prompt return to the clinic as noted below under patient instructions.  She expressed understanding of these directions and is in agreement with the plan.       Procedure     The external hemorrhoid and surrounding tissue was cleansed thoroughly using Betadine swabs.  3 mL of lidocaine 1% with epinephrine was infused into the dome of the hemorrhoid.  After verifying adequate anesthesia had been achieved, an elliptical incision was made radially outward across the dome of the hemorrhoid.  The hemorrhoid was explored with hemostats and multiple loculated clots were removed.  At the conclusion of the procedure, there was no evidence of remaining clots.  There was no active bleeding.  A pressure dressing was applied over the hemorrhoid in the buttocks were taped together.  I instructed her to keep this  pack in place for the next 12 hours.  She will return to the clinic if there is active bleeding.  She will follow-up with her PCP for wound check early next week.     Patient Instructions     Patient Instructions   Starting tomorrow, soak in a warm bath at least 1-2 times per day.    Please follow-up with your regular doctor or 1 of their partners on Monday for a wound check.    Please return to the clinic if you notice any of the following:    Fever / chills.    Increasing pain.    Continued bleeding from the hemorrhoid after 3 - 4 hours.        Discussed benefit vs risk of medications, dosing, side effects.  Patient was able to verbalize understanding.  After visit summary was provided for patient.     Frandy Flanagan PA-C

## 2021-06-19 NOTE — LETTER
Letter by Meagan Esparza MD at      Author: Meagan Esparza MD Service: -- Author Type: --    Filed:  Encounter Date: 11/24/2019 Status: Signed         November 24, 2019     Patient: Jeremy Flores   YOB: 1990   Date of Visit: 11/24/2019       To Whom it May Concern:    Jeremy Flores was seen in my clinic on 11/24/2019 for a thrombosed hemorrhoid. Thrombosis was excised in the clinic today.    If you have any questions or concerns, please don't hesitate to call.    Sincerely,         Electronically signed by Meagan Esparza MD

## 2021-06-20 NOTE — ANESTHESIA PROCEDURE NOTES
Epidural Block    Patient location during procedure: OB  Time Called: 9/16/2018 3:20 AM  Reason for Block:labor epidural  Staffing:  Performing  Anesthesiologist: REZA PEREZ  Preanesthetic Checklist  Completed: patient identified, risks, benefits, and alternatives discussed, timeout performed, consent obtained, airway assessed, oxygen available, suction available, emergency drugs available and hand hygiene performed  Procedure  Patient position: right lateral decubitus  Prep: ChloraPrep  Patient monitoring: continuous pulse oximetry, heart rate and blood pressure  Approach: midline  Location: L2-L3  Epidural technique: JOSE to local.  Number of Attempts:1  Needle  Needle type: Melissa   Needle gauge: 18 G     Catheter in Space: 4  Assessment  Sensory level:  No complicationsSensory level: not assessed at this time.

## 2021-06-20 NOTE — PROGRESS NOTES
"Pt here \"not sure if she is in labor or not.\" She states she is nikhil every 7-10 minutes and has been doing so for the last few days since her Dr. Stripped her membranes in the clinic earlier this week. NST reactive, Dr. Mehta notified of pt arrival and complaints. Pt requested her cervix to be rechecked after about an hour because she feels like the contractions went away. SVE unchanged. Pt DC'd home to follow up in clinic on Monday as scheduled.  "

## 2021-06-20 NOTE — ANESTHESIA PREPROCEDURE EVALUATION
"Anesthesia Evaluation      Patient summary reviewed   No history of anesthetic complications     Airway   Mallampati: II  Neck ROM: full   Pulmonary - negative ROS and normal exam                          Cardiovascular - negative ROS and normal exam  Exercise tolerance: > or = 4 METS   Neuro/Psych - negative ROS     Endo/Other    (+) pregnant     GI/Hepatic/Renal - negative ROS      Other findings: Has had labor epidurals before, thought one might have been \"too strong\". Done here, will look for records back in office.      Dental - normal exam                        Anesthesia Plan  Planned anesthetic: epidural    ASA 2     Anesthetic plan and risks discussed with: patient    Post-op plan: routine recovery          "

## 2023-02-16 ENCOUNTER — HOSPITAL ENCOUNTER (EMERGENCY)
Facility: HOSPITAL | Age: 33
Discharge: LEFT BEFORE DISCHARGE | End: 2023-02-16
Admitting: PHYSICIAN ASSISTANT
Payer: COMMERCIAL

## 2023-02-16 VITALS
OXYGEN SATURATION: 99 % | BODY MASS INDEX: 30.23 KG/M2 | HEIGHT: 61 IN | RESPIRATION RATE: 16 BRPM | TEMPERATURE: 98.3 F | DIASTOLIC BLOOD PRESSURE: 51 MMHG | SYSTOLIC BLOOD PRESSURE: 96 MMHG | HEART RATE: 80 BPM

## 2023-02-16 LAB
ALBUMIN SERPL BCG-MCNC: 4.6 G/DL (ref 3.5–5.2)
ALBUMIN UR-MCNC: NEGATIVE MG/DL
ALP SERPL-CCNC: 51 U/L (ref 35–104)
ALT SERPL W P-5'-P-CCNC: 47 U/L (ref 10–35)
ANION GAP SERPL CALCULATED.3IONS-SCNC: 11 MMOL/L (ref 7–15)
APPEARANCE UR: CLEAR
AST SERPL W P-5'-P-CCNC: 32 U/L (ref 10–35)
BASOPHILS # BLD AUTO: 0.1 10E3/UL (ref 0–0.2)
BASOPHILS NFR BLD AUTO: 1 %
BILIRUB DIRECT SERPL-MCNC: <0.2 MG/DL (ref 0–0.3)
BILIRUB SERPL-MCNC: 0.3 MG/DL
BILIRUB UR QL STRIP: NEGATIVE
BUN SERPL-MCNC: 11.8 MG/DL (ref 6–20)
CALCIUM SERPL-MCNC: 9.5 MG/DL (ref 8.6–10)
CHLORIDE SERPL-SCNC: 104 MMOL/L (ref 98–107)
COLOR UR AUTO: COLORLESS
CREAT SERPL-MCNC: 0.74 MG/DL (ref 0.51–0.95)
DEPRECATED HCO3 PLAS-SCNC: 24 MMOL/L (ref 22–29)
EOSINOPHIL # BLD AUTO: 0.3 10E3/UL (ref 0–0.7)
EOSINOPHIL NFR BLD AUTO: 4 %
ERYTHROCYTE [DISTWIDTH] IN BLOOD BY AUTOMATED COUNT: 12 % (ref 10–15)
GFR SERPL CREATININE-BSD FRML MDRD: >90 ML/MIN/1.73M2
GLUCOSE SERPL-MCNC: 106 MG/DL (ref 70–99)
GLUCOSE UR STRIP-MCNC: NEGATIVE MG/DL
HCT VFR BLD AUTO: 38.4 % (ref 35–47)
HGB BLD-MCNC: 12.9 G/DL (ref 11.7–15.7)
HGB UR QL STRIP: NEGATIVE
IMM GRANULOCYTES # BLD: 0 10E3/UL
IMM GRANULOCYTES NFR BLD: 0 %
KETONES UR STRIP-MCNC: NEGATIVE MG/DL
LEUKOCYTE ESTERASE UR QL STRIP: NEGATIVE
LIPASE SERPL-CCNC: 44 U/L (ref 13–60)
LYMPHOCYTES # BLD AUTO: 2.1 10E3/UL (ref 0.8–5.3)
LYMPHOCYTES NFR BLD AUTO: 28 %
MCH RBC QN AUTO: 30.6 PG (ref 26.5–33)
MCHC RBC AUTO-ENTMCNC: 33.6 G/DL (ref 31.5–36.5)
MCV RBC AUTO: 91 FL (ref 78–100)
MONOCYTES # BLD AUTO: 0.5 10E3/UL (ref 0–1.3)
MONOCYTES NFR BLD AUTO: 7 %
NEUTROPHILS # BLD AUTO: 4.5 10E3/UL (ref 1.6–8.3)
NEUTROPHILS NFR BLD AUTO: 60 %
NITRATE UR QL: NEGATIVE
NRBC # BLD AUTO: 0 10E3/UL
NRBC BLD AUTO-RTO: 0 /100
PH UR STRIP: 5 [PH] (ref 5–7)
PLATELET # BLD AUTO: 201 10E3/UL (ref 150–450)
POTASSIUM SERPL-SCNC: 3.8 MMOL/L (ref 3.4–5.3)
PROT SERPL-MCNC: 7.6 G/DL (ref 6.4–8.3)
RBC # BLD AUTO: 4.22 10E6/UL (ref 3.8–5.2)
RBC URINE: <1 /HPF
SODIUM SERPL-SCNC: 139 MMOL/L (ref 136–145)
SP GR UR STRIP: 1.01 (ref 1–1.03)
SQUAMOUS EPITHELIAL: <1 /HPF
UROBILINOGEN UR STRIP-MCNC: <2 MG/DL
WBC # BLD AUTO: 7.5 10E3/UL (ref 4–11)
WBC URINE: 1 /HPF

## 2023-02-16 PROCEDURE — 999N000104 HC STATISTIC NO CHARGE

## 2023-02-16 PROCEDURE — 81001 URINALYSIS AUTO W/SCOPE: CPT | Performed by: STUDENT IN AN ORGANIZED HEALTH CARE EDUCATION/TRAINING PROGRAM

## 2023-02-16 PROCEDURE — 36415 COLL VENOUS BLD VENIPUNCTURE: CPT | Performed by: STUDENT IN AN ORGANIZED HEALTH CARE EDUCATION/TRAINING PROGRAM

## 2023-02-16 PROCEDURE — 83690 ASSAY OF LIPASE: CPT | Performed by: PHYSICIAN ASSISTANT

## 2023-02-16 PROCEDURE — 85025 COMPLETE CBC W/AUTO DIFF WBC: CPT | Performed by: STUDENT IN AN ORGANIZED HEALTH CARE EDUCATION/TRAINING PROGRAM

## 2023-02-16 PROCEDURE — 82248 BILIRUBIN DIRECT: CPT | Performed by: PHYSICIAN ASSISTANT

## 2023-02-16 PROCEDURE — 80053 COMPREHEN METABOLIC PANEL: CPT | Performed by: STUDENT IN AN ORGANIZED HEALTH CARE EDUCATION/TRAINING PROGRAM

## 2023-02-16 NOTE — ED TRIAGE NOTES
Patient presents to ED for mid to right sided abdominal pain that increased in strength about an hour ago.  1 week ago had appendectomy.  Denies fever.      Triage Assessment     Row Name 02/16/23 1541       Triage Assessment (Adult)    Airway WDL WDL       Respiratory WDL    Respiratory WDL WDL       Skin Circulation/Temperature WDL    Skin Circulation/Temperature WDL WDL       Cardiac WDL    Cardiac WDL WDL       Peripheral/Neurovascular WDL    Peripheral Neurovascular WDL WDL       Cognitive/Neuro/Behavioral WDL    Cognitive/Neuro/Behavioral WDL WDL

## 2023-02-16 NOTE — ED NOTES
PT states that she wants to leave ama. IV removed. She did allow for a set of vitals to be acquired before she left.

## 2023-08-09 ENCOUNTER — TRANSFERRED RECORDS (OUTPATIENT)
Dept: HEALTH INFORMATION MANAGEMENT | Facility: CLINIC | Age: 33
End: 2023-08-09

## 2023-09-22 ENCOUNTER — HOSPITAL ENCOUNTER (EMERGENCY)
Facility: HOSPITAL | Age: 33
Discharge: HOME | End: 2023-09-22
Attending: EMERGENCY MEDICINE | Admitting: EMERGENCY MEDICINE
Payer: COMMERCIAL

## 2023-09-22 VITALS
DIASTOLIC BLOOD PRESSURE: 53 MMHG | SYSTOLIC BLOOD PRESSURE: 98 MMHG | RESPIRATION RATE: 20 BRPM | HEIGHT: 62 IN | BODY MASS INDEX: 26.31 KG/M2 | TEMPERATURE: 98.8 F | OXYGEN SATURATION: 99 % | WEIGHT: 143 LBS | HEART RATE: 67 BPM

## 2023-09-22 DIAGNOSIS — E86.0 DEHYDRATION: ICD-10-CM

## 2023-09-22 DIAGNOSIS — O21.9 NAUSEA AND VOMITING DURING PREGNANCY: ICD-10-CM

## 2023-09-22 LAB
ALBUMIN SERPL BCG-MCNC: 4.2 G/DL (ref 3.5–5.2)
ALP SERPL-CCNC: 52 U/L (ref 35–104)
ALT SERPL W P-5'-P-CCNC: 15 U/L (ref 0–50)
ANION GAP SERPL CALCULATED.3IONS-SCNC: 12 MMOL/L (ref 7–15)
AST SERPL W P-5'-P-CCNC: 22 U/L (ref 0–45)
BASOPHILS # BLD AUTO: 0.1 10E3/UL (ref 0–0.2)
BASOPHILS NFR BLD AUTO: 0 %
BILIRUB SERPL-MCNC: 0.4 MG/DL
BUN SERPL-MCNC: 11.8 MG/DL (ref 6–20)
CALCIUM SERPL-MCNC: 8.9 MG/DL (ref 8.6–10)
CHLORIDE SERPL-SCNC: 101 MMOL/L (ref 98–107)
CREAT SERPL-MCNC: 0.7 MG/DL (ref 0.51–0.95)
DEPRECATED HCO3 PLAS-SCNC: 23 MMOL/L (ref 22–29)
EGFRCR SERPLBLD CKD-EPI 2021: >90 ML/MIN/1.73M2
EOSINOPHIL # BLD AUTO: 0 10E3/UL (ref 0–0.7)
EOSINOPHIL NFR BLD AUTO: 0 %
ERYTHROCYTE [DISTWIDTH] IN BLOOD BY AUTOMATED COUNT: 11.1 % (ref 10–15)
GLUCOSE SERPL-MCNC: 86 MG/DL (ref 70–99)
HCT VFR BLD AUTO: 38.6 % (ref 35–47)
HGB BLD-MCNC: 13.2 G/DL (ref 11.7–15.7)
HOLD SPECIMEN: NORMAL
IMM GRANULOCYTES # BLD: 0 10E3/UL
IMM GRANULOCYTES NFR BLD: 0 %
LYMPHOCYTES # BLD AUTO: 1.8 10E3/UL (ref 0.8–5.3)
LYMPHOCYTES NFR BLD AUTO: 15 %
MCH RBC QN AUTO: 31.1 PG (ref 26.5–33)
MCHC RBC AUTO-ENTMCNC: 34.2 G/DL (ref 31.5–36.5)
MCV RBC AUTO: 91 FL (ref 78–100)
MONOCYTES # BLD AUTO: 0.7 10E3/UL (ref 0–1.3)
MONOCYTES NFR BLD AUTO: 6 %
NEUTROPHILS # BLD AUTO: 9.2 10E3/UL (ref 1.6–8.3)
NEUTROPHILS NFR BLD AUTO: 79 %
NRBC # BLD AUTO: 0 10E3/UL
NRBC BLD AUTO-RTO: 0 /100
PLATELET # BLD AUTO: 205 10E3/UL (ref 150–450)
POTASSIUM SERPL-SCNC: 3.7 MMOL/L (ref 3.4–5.3)
PROT SERPL-MCNC: 7.3 G/DL (ref 6.4–8.3)
RBC # BLD AUTO: 4.25 10E6/UL (ref 3.8–5.2)
SODIUM SERPL-SCNC: 136 MMOL/L (ref 136–145)
WBC # BLD AUTO: 11.8 10E3/UL (ref 4–11)

## 2023-09-22 PROCEDURE — 258N000003 HC RX IP 258 OP 636: Performed by: EMERGENCY MEDICINE

## 2023-09-22 PROCEDURE — 80053 COMPREHEN METABOLIC PANEL: CPT | Performed by: EMERGENCY MEDICINE

## 2023-09-22 PROCEDURE — 96361 HYDRATE IV INFUSION ADD-ON: CPT

## 2023-09-22 PROCEDURE — 250N000011 HC RX IP 250 OP 636: Performed by: EMERGENCY MEDICINE

## 2023-09-22 PROCEDURE — 99284 EMERGENCY DEPT VISIT MOD MDM: CPT | Mod: 25

## 2023-09-22 PROCEDURE — 96374 THER/PROPH/DIAG INJ IV PUSH: CPT

## 2023-09-22 PROCEDURE — 250N000011 HC RX IP 250 OP 636: Mod: JZ | Performed by: STUDENT IN AN ORGANIZED HEALTH CARE EDUCATION/TRAINING PROGRAM

## 2023-09-22 PROCEDURE — 36415 COLL VENOUS BLD VENIPUNCTURE: CPT | Performed by: EMERGENCY MEDICINE

## 2023-09-22 PROCEDURE — 85025 COMPLETE CBC W/AUTO DIFF WBC: CPT | Performed by: EMERGENCY MEDICINE

## 2023-09-22 RX ORDER — ONDANSETRON 2 MG/ML
4 INJECTION INTRAMUSCULAR; INTRAVENOUS ONCE
Status: COMPLETED | OUTPATIENT
Start: 2023-09-22 | End: 2023-09-22

## 2023-09-22 RX ADMIN — ONDANSETRON 4 MG: 2 INJECTION INTRAMUSCULAR; INTRAVENOUS at 15:09

## 2023-09-22 RX ADMIN — SODIUM CHLORIDE 1000 ML: 9 INJECTION, SOLUTION INTRAVENOUS at 16:31

## 2023-09-22 ASSESSMENT — ACTIVITIES OF DAILY LIVING (ADL)
ADLS_ACUITY_SCORE: 35
ADLS_ACUITY_SCORE: 35

## 2023-09-22 NOTE — ED PROVIDER NOTES
EMERGENCY DEPARTMENT ENCOUNTER      NAME: Jeremy Flores  AGE: 32 year old female  YOB: 1990  MRN: [id number]  EVALUATION DATE & TIME: 9/22/2023  3:10 PM    PCP: Ena Boyle    ED PROVIDER: Mona Shaver M.D.      Chief Complaint   Patient presents with    Morning Sickness     FINAL IMPRESSION:  1. Nausea and vomiting during pregnancy    2. Dehydration        ED COURSE & MEDICAL DECISION MAKING:    Pertinent Labs & Imaging studies reviewed. (See chart for details)  ED Course as of 09/22/23 2222   Fri Sep 22, 2023   1535 Patient is a pleasant 32-year-old female who is currently 6 weeks and 2 days pregnant with her sixth pregnancy.  She had 4 days of nausea and vomiting.  She had similar with other pregnancies and typically just has to wait for it to pass.  She feels like she is dehydrated however.  She has had a good response to IV Zofran in the past so I will order some for her.  We will give her some IV fluids.  She can follow-up with [place] [facility].  She is not having any abdominal pain, cramping, or bleeding.  I do not think an ultrasound would likely change anything and she is still quite early on.  I discussed the plan with her and she is in agreement.   1955 Patient is looking much better.  She says she is feeling a lot better.  She was able to eat and drink a little bit here.  We will work on getting her discharged.     3:40 PM I introduced myself to the patient, obtained patient history, performed a physical exam, and discussed plan for ED workup including potential diagnostic laboratory/imaging studies and interventions.    Medical Decision Making    History:  Supplemental history from: N/A  External Record(s) reviewed: I reviewed the patient's office note from 5/15/2023.  At that time she had her IUD removed.    Work Up:  Emergent/Severe conditions considered and evaluated for: Electrolyte abnormality, dehydration  I independently reviewed and interpreted none  In additional to work  "up documented, I considered the following work up: Consider pelvic ultrasound but patient has no bleeding cramping or pain to require it.  Medications given that require intensive monitoring for toxicity: None    External consultation:  Discussion of management with another provider: None    Complicating factors:  Care impacted by chronic illness: None  Care affected by social determinants of health: None    Disposition considerations: Discharge  Prescriptions considered/prescribed: None    At the conclusion of the encounter I discussed  the results of all of the tests and the disposition with patient.   All questions were answered.  The patient acknowledged understanding and was involved in the decision making regarding the overall care plan.      I discussed with patient the utility, limitations and findings of the exam/interventions/studies done during this visit as well as the list of differential diagnosis and symptoms to monitor/return to ER for.  Additional verbal discharge instructions were provided.       MEDICATIONS GIVEN IN THE EMERGENCY:  Medications   ondansetron (ZOFRAN) injection 4 mg (4 mg Intravenous $Given 23 6268)   sodium chloride 0.9% BOLUS 1,000 mL (0 mLs Intravenous Stopped 23 8640)       NEW PRESCRIPTIONS STARTED AT TODAY'S ER VISIT  Discharge Medication List as of 2023  8:07 PM             =================================================================    HPI    Triage Note: Patient 6 weeks gestation,  6 Para 4014. Here for nausea and vomiting for 4 days, no diarrhea, \"I feel like I am dehydrated\". No pain, no vaginal bleeding    Patient information was obtained from: Patient    Use of : N/A         Jeremy Flores is a 32 year old female who is 6wks2d pregnant and presents with nausea and vomiting. Patient reports dehydration and not being able to eat or drink anything for 4 days. This is her 6th pregnancy. She says she has experienced similar vomiting with her " "prior pregnancies. She states that nothing helps her symptoms, so she just has to wait them out. Patient denies any cramping or bleeding.    PAST MEDICAL HISTORY:  Past Medical History:   Diagnosis Date    Abnormal Pap smear of cervix     Allergic     Gonorrhea     Migraine     Urinary tract infection     Varicella        PAST SURGICAL HISTORY:  Past Surgical History:   Procedure Laterality Date    BIOPSY BREAST      COLPOSCOPY         CURRENT MEDICATIONS:    No current facility-administered medications for this encounter.    Current Outpatient Medications:     acetaminophen (TYLENOL) 325 MG tablet, [ACETAMINOPHEN (TYLENOL) 325 MG TABLET] Take 1-2 tablets (325-650 mg total) by mouth every 4 (four) hours as needed., Disp: , Rfl: 0    docusate sodium (COLACE) 100 MG capsule, [DOCUSATE SODIUM (COLACE) 100 MG CAPSULE] Take 1 capsule (100 mg total) by mouth daily., Disp: , Rfl: 0    ibuprofen (ADVIL,MOTRIN) 800 MG tablet, [IBUPROFEN (ADVIL,MOTRIN) 800 MG TABLET] Take 1 tablet (800 mg total) by mouth every 8 (eight) hours., Disp: 60 tablet, Rfl: 0    ALLERGIES:  Allergies   Allergen Reactions    Metronidazole Nausea and Vomiting       FAMILY HISTORY:  Family History   Problem Relation Age of Onset    Diabetes Maternal Grandmother     Kidney Disease Mother     Asthma Brother     Hearing Loss Brother        SOCIAL HISTORY:   Social History     Socioeconomic History    Marital status:     Number of children: 2   Tobacco Use    Smoking status: Never    Smokeless tobacco: Never    Tobacco comments:      smokes outside   Substance and Sexual Activity    Alcohol use: No    Drug use: No    Sexual activity: Yes     Partners: Male     Birth control/protection: None       PHYSICAL EXAM    VITAL SIGNS: BP 98/53   Pulse 67   Temp 98.8  F (37.1  C)   Resp 20   Ht 1.575 m (5' 2\")   Wt 64.9 kg (143 lb)   LMP 08/09/2023   SpO2 99%   BMI 26.16 kg/m     GENERAL: Awake, alert, answering questions appropriately, Mildly " uncomfortable  SPEECH:  Easy to understand speech, Normal volume and adam  PULMONARY: No respiratory distress, Lungs clear to auscultation bilaterally  CARDIOVASCULAR: Regular rate and rhythm, Distal pulses present and normal.  ABDOMINAL: Soft, Nondistended, Nontender, No rebound or guarding, No palpable masses  EXTREMITIES: No lower extremity edema.  PSYCH: Normal mood and affect     LAB:  All pertinent labs reviewed and interpreted.  Results for orders placed or performed during the hospital encounter of 09/22/23   Comprehensive metabolic panel   Result Value Ref Range    Sodium 136 136 - 145 mmol/L    Potassium 3.7 3.4 - 5.3 mmol/L    Chloride 101 98 - 107 mmol/L    Carbon Dioxide (CO2) 23 22 - 29 mmol/L    Anion Gap 12 7 - 15 mmol/L    Urea Nitrogen 11.8 6.0 - 20.0 mg/dL    Creatinine 0.70 0.51 - 0.95 mg/dL    Calcium 8.9 8.6 - 10.0 mg/dL    Glucose 86 70 - 99 mg/dL    Alkaline Phosphatase 52 35 - 104 U/L    AST 22 0 - 45 U/L    ALT 15 0 - 50 U/L    Protein Total 7.3 6.4 - 8.3 g/dL    Albumin 4.2 3.5 - 5.2 g/dL    Bilirubin Total 0.4 <=1.2 mg/dL    GFR Estimate >90 >60 mL/min/1.73m2   CBC with platelets and differential   Result Value Ref Range    WBC Count 11.8 (H) 4.0 - 11.0 10e3/uL    RBC Count 4.25 3.80 - 5.20 10e6/uL    Hemoglobin 13.2 11.7 - 15.7 g/dL    Hematocrit 38.6 35.0 - 47.0 %    MCV 91 78 - 100 fL    MCH 31.1 26.5 - 33.0 pg    MCHC 34.2 31.5 - 36.5 g/dL    RDW 11.1 10.0 - 15.0 %    Platelet Count 205 150 - 450 10e3/uL    % Neutrophils 79 %    % Lymphocytes 15 %    % Monocytes 6 %    % Eosinophils 0 %    % Basophils 0 %    % Immature Granulocytes 0 %    NRBCs per 100 WBC 0 <1 /100    Absolute Neutrophils 9.2 (H) 1.6 - 8.3 10e3/uL    Absolute Lymphocytes 1.8 0.8 - 5.3 10e3/uL    Absolute Monocytes 0.7 0.0 - 1.3 10e3/uL    Absolute Eosinophils 0.0 0.0 - 0.7 10e3/uL    Absolute Basophils 0.1 0.0 - 0.2 10e3/uL    Absolute Immature Granulocytes 0.0 <=0.4 10e3/uL    Absolute NRBCs 0.0 10e3/uL   Extra  Red Top Tube   Result Value Ref Range    Hold Specimen JIC    Extra Blue Top Tube   Result Value Ref Range    Hold Specimen JIC    Extra Green Top (Lithium Heparin) Tube   Result Value Ref Range    Hold Specimen JIC      I, Corrie Navarro, am serving as a scribe to document services personally performed by Dr. Shaver based on my observation and the provider's statements to me. I, Mona Shaver MD attest that Corrie Navarro is acting in a scribe capacity, has observed my performance of the services and has documented them in accordance with my direction.    Mona Shaver M.D.  Emergency Medicine  [facility] EMERGENCY DEPARTMENT  [facility] [facility] [address]  [phone]  Dept: [phone]       Mona Shaver MD  09/22/23 1024

## 2023-09-22 NOTE — ED TRIAGE NOTES
"Patient 6 weeks gestation,  6 Para 4014. Here for nausea and vomiting for 4 days, no diarrhea, \"I feel like I am dehydrated\". No pain, no vaginal bleeding        "

## 2023-09-23 NOTE — DISCHARGE INSTRUCTIONS
You were seen in the Emergency Department today for evaluation of nausea and vomiting and dehydration.  Your lab work showed no cause of your symptoms.   Follow up with your primary care physician to ensure resolution of symptoms. Return if you have new or worsening symptoms.

## 2023-09-27 ENCOUNTER — HOSPITAL ENCOUNTER (EMERGENCY)
Facility: HOSPITAL | Age: 33
Discharge: HOME | End: 2023-09-27
Admitting: PHYSICIAN ASSISTANT
Payer: COMMERCIAL

## 2023-09-27 VITALS
DIASTOLIC BLOOD PRESSURE: 60 MMHG | SYSTOLIC BLOOD PRESSURE: 100 MMHG | RESPIRATION RATE: 16 BRPM | OXYGEN SATURATION: 100 % | BODY MASS INDEX: 26.31 KG/M2 | WEIGHT: 143 LBS | HEIGHT: 62 IN | HEART RATE: 64 BPM | TEMPERATURE: 98.6 F

## 2023-09-27 DIAGNOSIS — O21.9 NAUSEA AND VOMITING IN PREGNANCY: ICD-10-CM

## 2023-09-27 LAB
ALBUMIN SERPL BCG-MCNC: 3.9 G/DL (ref 3.5–5.2)
ALP SERPL-CCNC: 45 U/L (ref 35–104)
ALT SERPL W P-5'-P-CCNC: 12 U/L (ref 0–50)
ANION GAP SERPL CALCULATED.3IONS-SCNC: 9 MMOL/L (ref 7–15)
AST SERPL W P-5'-P-CCNC: 15 U/L (ref 0–45)
BILIRUB SERPL-MCNC: 0.3 MG/DL
BUN SERPL-MCNC: 9 MG/DL (ref 6–20)
CALCIUM SERPL-MCNC: 9.2 MG/DL (ref 8.6–10)
CHLORIDE SERPL-SCNC: 101 MMOL/L (ref 98–107)
CREAT SERPL-MCNC: 0.66 MG/DL (ref 0.51–0.95)
DEPRECATED HCO3 PLAS-SCNC: 25 MMOL/L (ref 22–29)
EGFRCR SERPLBLD CKD-EPI 2021: >90 ML/MIN/1.73M2
ERYTHROCYTE [DISTWIDTH] IN BLOOD BY AUTOMATED COUNT: 10.9 % (ref 10–15)
GLUCOSE SERPL-MCNC: 96 MG/DL (ref 70–99)
HCT VFR BLD AUTO: 37.5 % (ref 35–47)
HGB BLD-MCNC: 12.9 G/DL (ref 11.7–15.7)
MCH RBC QN AUTO: 30.7 PG (ref 26.5–33)
MCHC RBC AUTO-ENTMCNC: 34.4 G/DL (ref 31.5–36.5)
MCV RBC AUTO: 89 FL (ref 78–100)
PLATELET # BLD AUTO: 194 10E3/UL (ref 150–450)
POTASSIUM SERPL-SCNC: 3.7 MMOL/L (ref 3.4–5.3)
PROT SERPL-MCNC: 6.8 G/DL (ref 6.4–8.3)
RBC # BLD AUTO: 4.2 10E6/UL (ref 3.8–5.2)
SODIUM SERPL-SCNC: 135 MMOL/L (ref 135–145)
WBC # BLD AUTO: 10.9 10E3/UL (ref 4–11)

## 2023-09-27 PROCEDURE — 99284 EMERGENCY DEPT VISIT MOD MDM: CPT | Mod: 25

## 2023-09-27 PROCEDURE — 96361 HYDRATE IV INFUSION ADD-ON: CPT

## 2023-09-27 PROCEDURE — 250N000011 HC RX IP 250 OP 636: Mod: JZ | Performed by: PHYSICIAN ASSISTANT

## 2023-09-27 PROCEDURE — 258N000003 HC RX IP 258 OP 636: Performed by: PHYSICIAN ASSISTANT

## 2023-09-27 PROCEDURE — 80053 COMPREHEN METABOLIC PANEL: CPT | Performed by: PHYSICIAN ASSISTANT

## 2023-09-27 PROCEDURE — 96374 THER/PROPH/DIAG INJ IV PUSH: CPT

## 2023-09-27 PROCEDURE — 36415 COLL VENOUS BLD VENIPUNCTURE: CPT | Performed by: PHYSICIAN ASSISTANT

## 2023-09-27 PROCEDURE — 85027 COMPLETE CBC AUTOMATED: CPT | Performed by: PHYSICIAN ASSISTANT

## 2023-09-27 RX ORDER — ONDANSETRON 2 MG/ML
4 INJECTION INTRAMUSCULAR; INTRAVENOUS ONCE
Status: COMPLETED | OUTPATIENT
Start: 2023-09-27 | End: 2023-09-27

## 2023-09-27 RX ORDER — PYRIDOXINE HCL (VITAMIN B6) 25 MG
25 TABLET ORAL EVERY 8 HOURS PRN
Qty: 20 TABLET | Refills: 0 | Status: SHIPPED | OUTPATIENT
Start: 2023-09-27 | End: 2024-04-17

## 2023-09-27 RX ORDER — METOCLOPRAMIDE 10 MG/1
10 TABLET ORAL
Qty: 20 TABLET | Refills: 0 | Status: SHIPPED | OUTPATIENT
Start: 2023-09-27 | End: 2024-04-17

## 2023-09-27 RX ADMIN — SODIUM CHLORIDE, POTASSIUM CHLORIDE, SODIUM LACTATE AND CALCIUM CHLORIDE 1000 ML: 600; 310; 30; 20 INJECTION, SOLUTION INTRAVENOUS at 19:48

## 2023-09-27 RX ADMIN — ONDANSETRON 4 MG: 2 INJECTION INTRAMUSCULAR; INTRAVENOUS at 19:48

## 2023-09-27 ASSESSMENT — ACTIVITIES OF DAILY LIVING (ADL)
ADLS_ACUITY_SCORE: 35
ADLS_ACUITY_SCORE: 35

## 2023-09-27 NOTE — ED TRIAGE NOTES
Pt here for emesis with pregnancy pt is 7 weeks gestation with due date ofr 05/15/2024.  Pt sent here from clinic     Triage Assessment       Row Name 09/27/23 0642       Triage Assessment (Adult)    Airway WDL WDL       Respiratory WDL    Respiratory WDL WDL       Skin Circulation/Temperature WDL    Skin Circulation/Temperature WDL WDL       Cardiac WDL    Cardiac WDL WDL       Peripheral/Neurovascular WDL    Peripheral Neurovascular WDL WDL       Cognitive/Neuro/Behavioral WDL    Cognitive/Neuro/Behavioral WDL WDL

## 2023-09-27 NOTE — ED PROVIDER NOTES
EMERGENCY DEPARTMENT ENCOUNTER      NAME: Jeremy Flores  AGE: 32 year old female  YOB: 1990  MRN: [id number]  EVALUATION DATE & TIME: 2023  6:29 PM    PCP: Ena Boyle    ED PROVIDER: Karen Sanchez PA-C      Chief Complaint   Patient presents with    Emesis During Pregnancy         FINAL IMPRESSION:  1. Nausea and vomiting in pregnancy          ED COURSE & MEDICAL DECISION MAKIN:36 PM I introduced myself to patient, performed initial HPI and examination.       32 year old female with PMH  Hyperemesis, thrombosed external hemorrhoids presents to the Emergency Department for evaluation of nausea and vomiting in pregnancy.  Patient reports vomiting once this morning but has not been able to eat or drink anything today.  Feels lightheaded, dizzy.  Was seen in clinic earlier today but and able to receive fluids so she came to the emergency department.  This patient is 6 pregnancy, has a history of prior.  At least 7 weeks pregnant.  Was seen 4 days ago for similar.  Denies any new abdominal pain/cramping, no vaginal bleeding or discharge.    VSS, afebrile  Exam with well-appearing female, no reproducible abdominal tenderness.  Labs obtained, unremarkable.    Patient was given Zofran, IV fluids.  Patient is tolerating p.o. intake and appropriate for discharge.    No evidence of profound dehydration that would require admission.  Will treat symptomatically on an outpatient basis with close follow-up with OB/GYN.  Will discharge with a prescription for Reglan, B6, Unisom.  Discussed additional supportive management.  Encouraged on close ED return precautions.  All questions answered to the best my ability patient is agreeable with plan.    Medical Decision Making    History:  Supplemental history from: Documented in chart, if applicable  External Record(s) reviewed: Documented in chart, if applicable.    Work Up:  Chart documentation includes differential considered and any EKGs or imaging  independently interpreted by provider.  In additional to work up documented, I considered the following work up: Documented in chart, if applicable.    External consultation:  Discussion of management with another provider: Documented in chart, if applicable    Complicating factors:  Care impacted by chronic illness: Other: Pregnancy, Hyperemesis Gravidarum  Care affected by social determinants of health: N/A    Disposition considerations: Discharge. I prescribed additional prescription strength medication(s) as charted. See documentation for any additional details.      MEDICATIONS GIVEN IN THE EMERGENCY:  Medications   ondansetron (ZOFRAN) injection 4 mg (4 mg Intravenous $Given 9/27/23 1948)   lactated ringers BOLUS 1,000 mL (0 mLs Intravenous Stopped 9/27/23 2153)       NEW PRESCRIPTIONS STARTED AT TODAY'S ER VISIT  Discharge Medication List as of 9/27/2023  9:56 PM        START taking these medications    Details   doxylamine (UNISOM SLEEPTABS) 25 MG TABS tablet Take 0.5 tablets (12.5 mg) by mouth every 8 hours as needed for other (nausea/vomiting, taken with B6), Disp-20 tablet, R-0, Local Print      metoclopramide (REGLAN) 10 MG tablet Take 1 tablet (10 mg) by mouth 4 times daily (before meals and nightly), Disp-20 tablet, R-0, Local Print      pyridOXINE (VITAMIN B6) 25 MG tablet Take 1 tablet (25 mg) by mouth every 8 hours as needed (nausea), Disp-20 tablet, R-0, Local Print                =================================================================    HPI    Patient information was obtained from: Patient     Use of : N/A         Jeremy Flores is a 32 year old female with a pertinent history of hyperemesis gravidarum, anxiety who presents to this ED ambulatory for evaluation of emesis during pregnancy.    The patient reports she is 7 weeks pregnant with frequent emesis. States that she was seen here in the ED a few days ago for same symptoms, returns to the ED due to continued nausea and vomiting  in addition to feelings of lightheadedness and fatigue. She explains that this is her 6th pregnancy, and she has had hyperemesis in each pregnancy during the 1st trimester.     She explains that her last episode of emesis was this morning, since then she has not eaten anything and has drank minimal amount of liquids. Was seen earlier today at her clinic, but they were unable to give her fluids, so patient presented to the ED. She denies any abdominal pain or cramping, or vaginal bleeding.     Chart Review  [place] [facility] 09/22/2023  Presented with 4 days of nausea and vomiting while 6 weeks pregnant, with feelings of dehydration. Patient given IV Zofran and IV fluids, and discharged with instructions to follow-up with [facility].       REVIEW OF SYSTEMS   ROS negative unless otherwise stated in HPI    PAST MEDICAL HISTORY:  Past Medical History:   Diagnosis Date    Abnormal Pap smear of cervix     Allergic     Gonorrhea     Migraine     Urinary tract infection     Varicella        PAST SURGICAL HISTORY:  Past Surgical History:   Procedure Laterality Date    BIOPSY BREAST      COLPOSCOPY         CURRENT MEDICATIONS:    doxylamine (UNISOM SLEEPTABS) 25 MG TABS tablet  metoclopramide (REGLAN) 10 MG tablet  pyridOXINE (VITAMIN B6) 25 MG tablet  acetaminophen (TYLENOL) 325 MG tablet  docusate sodium (COLACE) 100 MG capsule  ibuprofen (ADVIL,MOTRIN) 800 MG tablet        ALLERGIES:  Allergies   Allergen Reactions    Metronidazole Nausea and Vomiting       FAMILY HISTORY:  Family History   Problem Relation Age of Onset    Diabetes Maternal Grandmother     Kidney Disease Mother     Asthma Brother     Hearing Loss Brother        SOCIAL HISTORY:   Social History     Socioeconomic History    Marital status:     Number of children: 2   Tobacco Use    Smoking status: Never    Smokeless tobacco: Never    Tobacco comments:      smokes outside   Substance and Sexual Activity    Alcohol use: No    Drug use: No  "   Sexual activity: Yes     Partners: Male     Birth control/protection: None       VITALS:  /60   Pulse 64   Temp 98.6  F (37  C) (Oral)   Resp 16   Ht 1.575 m (5' 2\")   Wt 64.9 kg (143 lb)   LMP 08/09/2023 (Exact Date)   SpO2 100%   BMI 26.16 kg/m      PHYSICAL EXAM    Constitutional: Well developed, Well nourished, NAD, GCS 15   HENT: Normocephalic, Atraumatic.   Neck- Supple, Nontender. Normal ROM  Eyes: Conjunctiva normal  Respiratory: No respiratory distress, speaking in full sentences. Normal breath sounds  Cardiovascular: Normal heart rate, Regular rhythm, No murmurs.    GI: Soft, nontender. No CVA tenderness.    Musculoskeletal: No deformities, Moves all extremities equally.   Integument: Warm, Dry, No erythema, ecchymosis, or rash.  Neurologic: Alert & oriented x 3, Normal sensory function. No focal deficits.   Psychiatric: Affect normal, Judgment normal, Mood normal. Cooperative.      LAB:  All pertinent labs reviewed and interpreted.  Results for orders placed or performed during the hospital encounter of 09/27/23   CBC (+ platelets, no diff)   Result Value Ref Range    WBC Count 10.9 4.0 - 11.0 10e3/uL    RBC Count 4.20 3.80 - 5.20 10e6/uL    Hemoglobin 12.9 11.7 - 15.7 g/dL    Hematocrit 37.5 35.0 - 47.0 %    MCV 89 78 - 100 fL    MCH 30.7 26.5 - 33.0 pg    MCHC 34.4 31.5 - 36.5 g/dL    RDW 10.9 10.0 - 15.0 %    Platelet Count 194 150 - 450 10e3/uL   Comprehensive metabolic panel   Result Value Ref Range    Sodium 135 135 - 145 mmol/L    Potassium 3.7 3.4 - 5.3 mmol/L    Carbon Dioxide (CO2) 25 22 - 29 mmol/L    Anion Gap 9 7 - 15 mmol/L    Urea Nitrogen 9.0 6.0 - 20.0 mg/dL    Creatinine 0.66 0.51 - 0.95 mg/dL    GFR Estimate >90 >60 mL/min/1.73m2    Calcium 9.2 8.6 - 10.0 mg/dL    Chloride 101 98 - 107 mmol/L    Glucose 96 70 - 99 mg/dL    Alkaline Phosphatase 45 35 - 104 U/L    AST 15 0 - 45 U/L    ALT 12 0 - 50 U/L    Protein Total 6.8 6.4 - 8.3 g/dL    Albumin 3.9 3.5 - 5.2 g/dL    " Bilirubin Total 0.3 <=1.2 mg/dL       RADIOLOGY:  Reviewed all pertinent imaging. Please see official radiology report.  No orders to display       EKG:    None    PROCEDURES:   None      I, Ede Rubio, am serving as a scribe to document services personally performed by Karen Sanchez PA-C based on my observation and the provider's statements to me. I, Karen Sanchez PA-C, attest that Ede Rubio is acting in a scribe capacity, has observed my performance of the services and has documented them in accordance with my direction.    Karen Sanchez PA-C  Emergency Medicine  [facility] EMERGENCY DEPARTMENT  [facility] [facility] [id number] [phone]             Karen Sanchez PA-C  09/27/23 3431

## 2023-09-27 NOTE — Clinical Note
Jeremy Flores was seen and treated in our emergency department on 9/27/2023.  She may return to work on 09/30/2023.       If you have any questions or concerns, please don't hesitate to call.      Karen Sanchez PA-C

## 2023-09-28 NOTE — DISCHARGE INSTRUCTIONS
Try sipping water, Gatorade, Pedialyte regularly throughout the day to stay hydrated despite your nausea.  Can use Reglan as needed every 6 hours or nausea/vomiting.  You can also use B6 with or without Unisom.  Unisom can make you very sleepy, no driving while taking this.    Try to take your prenatal vitamins.  It may be easier to try taking them at night or during Gummies.  Follow-up with OB/GYN for further management of your nausea and vomiting in pregnancy for further OB care

## 2023-09-29 ENCOUNTER — MEDICAL CORRESPONDENCE (OUTPATIENT)
Dept: HEALTH INFORMATION MANAGEMENT | Facility: CLINIC | Age: 33
End: 2023-09-29
Payer: MEDICAID

## 2023-09-29 LAB
HEPATITIS B SURFACE ANTIGEN (EXTERNAL): NONREACTIVE
HIV1+2 AB SERPL QL IA: NONREACTIVE
RUBELLA ANTIBODY IGG (EXTERNAL): NORMAL
TREPONEMA PALLIDUM ANTIBODY (EXTERNAL): NONREACTIVE

## 2023-10-06 DIAGNOSIS — O21.9 VOMITING OF PREGNANCY: Primary | ICD-10-CM

## 2023-10-06 RX ORDER — MEPERIDINE HYDROCHLORIDE 25 MG/ML
25 INJECTION INTRAMUSCULAR; INTRAVENOUS; SUBCUTANEOUS EVERY 30 MIN PRN
OUTPATIENT
Start: 2023-10-09

## 2023-10-06 RX ORDER — METHYLPREDNISOLONE SODIUM SUCCINATE 125 MG/2ML
125 INJECTION, POWDER, LYOPHILIZED, FOR SOLUTION INTRAMUSCULAR; INTRAVENOUS
Start: 2023-10-09

## 2023-10-06 RX ORDER — DEXTROSE, SODIUM CHLORIDE, SODIUM LACTATE, POTASSIUM CHLORIDE, AND CALCIUM CHLORIDE 5; .6; .31; .03; .02 G/100ML; G/100ML; G/100ML; G/100ML; G/100ML
INJECTION, SOLUTION INTRAVENOUS CONTINUOUS
Start: 2023-10-09

## 2023-10-06 RX ORDER — EPINEPHRINE 1 MG/ML
0.3 INJECTION, SOLUTION, CONCENTRATE INTRAVENOUS EVERY 5 MIN PRN
OUTPATIENT
Start: 2023-10-09

## 2023-10-06 RX ORDER — HEPARIN SODIUM (PORCINE) LOCK FLUSH IV SOLN 100 UNIT/ML 100 UNIT/ML
5 SOLUTION INTRAVENOUS
OUTPATIENT
Start: 2023-10-09

## 2023-10-06 RX ORDER — HEPARIN SODIUM,PORCINE 10 UNIT/ML
5-20 VIAL (ML) INTRAVENOUS DAILY PRN
OUTPATIENT
Start: 2023-10-09

## 2023-10-06 RX ORDER — DIPHENHYDRAMINE HYDROCHLORIDE 50 MG/ML
50 INJECTION INTRAMUSCULAR; INTRAVENOUS
Start: 2023-10-09

## 2023-10-06 RX ORDER — ALBUTEROL SULFATE 90 UG/1
1-2 AEROSOL, METERED RESPIRATORY (INHALATION)
Start: 2023-10-09

## 2023-10-06 RX ORDER — ALBUTEROL SULFATE 0.83 MG/ML
2.5 SOLUTION RESPIRATORY (INHALATION)
OUTPATIENT
Start: 2023-10-09

## 2024-04-11 ENCOUNTER — APPOINTMENT (OUTPATIENT)
Dept: CT IMAGING | Facility: HOSPITAL | Age: 34
End: 2024-04-11
Attending: EMERGENCY MEDICINE
Payer: COMMERCIAL

## 2024-04-11 ENCOUNTER — HOSPITAL ENCOUNTER (EMERGENCY)
Facility: HOSPITAL | Age: 34
Discharge: HOME | End: 2024-04-11
Attending: EMERGENCY MEDICINE | Admitting: EMERGENCY MEDICINE
Payer: COMMERCIAL

## 2024-04-11 ENCOUNTER — TELEPHONE (OUTPATIENT)
Dept: NURSING | Facility: CLINIC | Age: 34
End: 2024-04-11

## 2024-04-11 VITALS
BODY MASS INDEX: 30.73 KG/M2 | RESPIRATION RATE: 17 BRPM | DIASTOLIC BLOOD PRESSURE: 53 MMHG | SYSTOLIC BLOOD PRESSURE: 93 MMHG | TEMPERATURE: 98.5 F | OXYGEN SATURATION: 98 % | WEIGHT: 168 LBS | HEART RATE: 73 BPM

## 2024-04-11 DIAGNOSIS — R55 NEAR SYNCOPE: ICD-10-CM

## 2024-04-11 LAB
ALBUMIN SERPL BCG-MCNC: 3.4 G/DL (ref 3.5–5.2)
ALBUMIN UR-MCNC: 20 MG/DL
ALP SERPL-CCNC: 113 U/L (ref 40–150)
ALT SERPL W P-5'-P-CCNC: 9 U/L (ref 0–50)
ANION GAP SERPL CALCULATED.3IONS-SCNC: 13 MMOL/L (ref 7–15)
APPEARANCE UR: CLEAR
AST SERPL W P-5'-P-CCNC: 19 U/L (ref 0–45)
BACTERIA #/AREA URNS HPF: ABNORMAL /HPF
BASOPHILS # BLD AUTO: 0 10E3/UL (ref 0–0.2)
BASOPHILS NFR BLD AUTO: 1 %
BILIRUB SERPL-MCNC: 0.2 MG/DL
BILIRUB UR QL STRIP: NEGATIVE
BUN SERPL-MCNC: 8.1 MG/DL (ref 6–20)
CALCIUM SERPL-MCNC: 8.8 MG/DL (ref 8.6–10)
CHLORIDE SERPL-SCNC: 103 MMOL/L (ref 98–107)
COLOR UR AUTO: ABNORMAL
CREAT SERPL-MCNC: 0.56 MG/DL (ref 0.51–0.95)
D DIMER PPP FEU-MCNC: 2.04 UG/ML FEU (ref 0–0.5)
DEPRECATED HCO3 PLAS-SCNC: 20 MMOL/L (ref 22–29)
EGFRCR SERPLBLD CKD-EPI 2021: >90 ML/MIN/1.73M2
EOSINOPHIL # BLD AUTO: 0.1 10E3/UL (ref 0–0.7)
EOSINOPHIL NFR BLD AUTO: 1 %
ERYTHROCYTE [DISTWIDTH] IN BLOOD BY AUTOMATED COUNT: 12 % (ref 10–15)
GLUCOSE SERPL-MCNC: 86 MG/DL (ref 70–99)
GLUCOSE UR STRIP-MCNC: NEGATIVE MG/DL
HCT VFR BLD AUTO: 33.8 % (ref 35–47)
HGB BLD-MCNC: 11.4 G/DL (ref 11.7–15.7)
HGB UR QL STRIP: NEGATIVE
HOLD SPECIMEN: NORMAL
IMM GRANULOCYTES # BLD: 0.2 10E3/UL
IMM GRANULOCYTES NFR BLD: 2 %
KETONES UR STRIP-MCNC: NEGATIVE MG/DL
LEUKOCYTE ESTERASE UR QL STRIP: ABNORMAL
LYMPHOCYTES # BLD AUTO: 1.5 10E3/UL (ref 0.8–5.3)
LYMPHOCYTES NFR BLD AUTO: 17 %
MCH RBC QN AUTO: 31.1 PG (ref 26.5–33)
MCHC RBC AUTO-ENTMCNC: 33.7 G/DL (ref 31.5–36.5)
MCV RBC AUTO: 92 FL (ref 78–100)
MONOCYTES # BLD AUTO: 0.5 10E3/UL (ref 0–1.3)
MONOCYTES NFR BLD AUTO: 6 %
MUCOUS THREADS #/AREA URNS LPF: PRESENT /LPF
NEUTROPHILS # BLD AUTO: 6.4 10E3/UL (ref 1.6–8.3)
NEUTROPHILS NFR BLD AUTO: 73 %
NITRATE UR QL: NEGATIVE
NRBC # BLD AUTO: 0 10E3/UL
NRBC BLD AUTO-RTO: 0 /100
NT-PROBNP SERPL-MCNC: <36 PG/ML (ref 0–450)
PH UR STRIP: 6 [PH] (ref 5–7)
PLATELET # BLD AUTO: ABNORMAL 10*3/UL
POTASSIUM SERPL-SCNC: 3.9 MMOL/L (ref 3.4–5.3)
PROT SERPL-MCNC: 6.7 G/DL (ref 6.4–8.3)
RBC # BLD AUTO: 3.66 10E6/UL (ref 3.8–5.2)
RBC URINE: 0 /HPF
SODIUM SERPL-SCNC: 136 MMOL/L (ref 135–145)
SP GR UR STRIP: 1.02 (ref 1–1.03)
SQUAMOUS EPITHELIAL: 11 /HPF
TROPONIN T SERPL HS-MCNC: <6 NG/L
UROBILINOGEN UR STRIP-MCNC: <2 MG/DL
WBC # BLD AUTO: 8.6 10E3/UL (ref 4–11)
WBC URINE: 34 /HPF

## 2024-04-11 PROCEDURE — 93005 ELECTROCARDIOGRAM TRACING: CPT | Performed by: STUDENT IN AN ORGANIZED HEALTH CARE EDUCATION/TRAINING PROGRAM

## 2024-04-11 PROCEDURE — 87086 URINE CULTURE/COLONY COUNT: CPT | Performed by: EMERGENCY MEDICINE

## 2024-04-11 PROCEDURE — 99285 EMERGENCY DEPT VISIT HI MDM: CPT | Mod: 25

## 2024-04-11 PROCEDURE — 80053 COMPREHEN METABOLIC PANEL: CPT | Performed by: EMERGENCY MEDICINE

## 2024-04-11 PROCEDURE — 85379 FIBRIN DEGRADATION QUANT: CPT | Performed by: EMERGENCY MEDICINE

## 2024-04-11 PROCEDURE — 81001 URINALYSIS AUTO W/SCOPE: CPT | Performed by: EMERGENCY MEDICINE

## 2024-04-11 PROCEDURE — 36415 COLL VENOUS BLD VENIPUNCTURE: CPT | Performed by: EMERGENCY MEDICINE

## 2024-04-11 PROCEDURE — 71275 CT ANGIOGRAPHY CHEST: CPT

## 2024-04-11 PROCEDURE — 250N000011 HC RX IP 250 OP 636: Performed by: EMERGENCY MEDICINE

## 2024-04-11 PROCEDURE — 84484 ASSAY OF TROPONIN QUANT: CPT | Performed by: STUDENT IN AN ORGANIZED HEALTH CARE EDUCATION/TRAINING PROGRAM

## 2024-04-11 PROCEDURE — 83880 ASSAY OF NATRIURETIC PEPTIDE: CPT | Performed by: EMERGENCY MEDICINE

## 2024-04-11 PROCEDURE — 85041 AUTOMATED RBC COUNT: CPT | Performed by: STUDENT IN AN ORGANIZED HEALTH CARE EDUCATION/TRAINING PROGRAM

## 2024-04-11 RX ORDER — IOPAMIDOL 755 MG/ML
90 INJECTION, SOLUTION INTRAVASCULAR ONCE
Status: COMPLETED | OUTPATIENT
Start: 2024-04-11 | End: 2024-04-11

## 2024-04-11 RX ADMIN — IOPAMIDOL 90 ML: 755 INJECTION, SOLUTION INTRAVENOUS at 14:48

## 2024-04-11 ASSESSMENT — ACTIVITIES OF DAILY LIVING (ADL)
ADLS_ACUITY_SCORE: 35

## 2024-04-11 ASSESSMENT — COLUMBIA-SUICIDE SEVERITY RATING SCALE - C-SSRS
6. HAVE YOU EVER DONE ANYTHING, STARTED TO DO ANYTHING, OR PREPARED TO DO ANYTHING TO END YOUR LIFE?: NO
1. IN THE PAST MONTH, HAVE YOU WISHED YOU WERE DEAD OR WISHED YOU COULD GO TO SLEEP AND NOT WAKE UP?: NO
2. HAVE YOU ACTUALLY HAD ANY THOUGHTS OF KILLING YOURSELF IN THE PAST MONTH?: NO

## 2024-04-11 NOTE — DISCHARGE INSTRUCTIONS
Updated pt on POC on pt going to 354. Pt AAOx4 and appropriate at this time. Respirations even and unlabored. No acute distress noted. Skin is warm and dry.    Your workup for syncope was normal, negative.  No blood clot seen on CT chest. No signs of heart attack, heart failure.  OB/GYN is ok with you going home and following up with them as previously scheduled.  I referred you to cardiology for outpatient workup as well.

## 2024-04-11 NOTE — ED PROVIDER NOTES
EMERGENCY DEPARTMENT ENCOUNTER      NAME: Jeremy Flores  AGE: 33 year old female  YOB: 1990  MRN: [id number]  EVALUATION DATE & TIME: 4/11/2024 10:41 AM    PCP: No Ref-Primary, Physician    ED PROVIDER: Meagan Velázquez M.D.      Chief Complaint   Patient presents with    Syncope       FINAL IMPRESSION:  1. Near syncope        ED COURSE & MEDICAL DECISION MAKING:    Near syncope.  Initially, patient sent to ED for syncope but further history reveals near syncope, only.  Suspected vasovagal near syncope given symptoms initiated with blood draw in Dec and as a child.  No red flags to suspect cardiac dysrhythmia, no risk factors for ACS, no meds to suspect side effect. I considered chf, cardiomyopathy, PE, orthostasis, deconditioning, reactive airway disease as possible causes. I ordered d dimer, troponin, cbc, cmp, bnp, ua. I ordered a ct pe after dimer resulted positive. I discussed case with [facility] prior to ordering workup due to patient's 3rd trimester pregnancy status, ob/gyn on call agrees with management and workup as planned.  I reviewed results of labs and CT PE, she has a contaminated UA without signs of significant dehydration, mild anemia which is stable, (hx of anemia with hb 11.3 in 2020), negative troponin and bnp, stable electrolytes.    10:55 AM I met with the patient to gather history and to perform my initial exam. I discussed the plan for care while in the Emergency Department.  3:20 PM Rechecked and updated patient. I discussed plans for discharge with the patient, which they were agreeable to. We discussed supportive cares at home and reasons for return to the ER including new or worsening symptoms. All questions and concerns were addressed. Patient to be discharged by RN.     ED Course as of 04/12/24 1546   Thu Apr 11, 2024   1130 Spoke to [facility], agree with CT PE if dimer positive, no need for NST or ultrasound, can discharge home if cleared from cardiology  "perspective.     Pertinent Labs & Imaging studies reviewed. (See chart for details).    Medical Decision Making  Obtained supplemental history:Supplemental history obtained?: No  Reviewed external records: External records reviewed?: Documented in chart  Care impacted by chronic illness:Mental Health  Care significantly affected by social determinants of health:N/A  Did you consider but not order tests?: Work up considered but not performed and documented in chart, if applicable  Did you interpret images independently?: Independent interpretation of ECG and images noted in documentation, when applicable.  Consultation discussion with other provider:Did you involve another provider (consultant, , pharmacy, etc.)?: I discussed the care with another health care provider, see documentation for details.  Discharge. No recommendations on prescription strength medication(s). I considered admission, but ultimately discharged patient after discussion with ob/gyn and after findings were negative for pe.    At the conclusion of the encounter I discussed the results of all of the tests and the disposition. The questions were answered. The patient or family acknowledged understanding and was agreeable with the care plan.      CRITICAL CARE:  N/A    HPI    Patient information was obtained from: Patient     Use of : N/A      Jeremy Flores is a 33 year old female with a pertinent medical history of anxiety and hyperemesis gravidarum, who presents to the ED for evaluation of syncope.     Patient states that she is currently pregnant ( at 35w1d) and reports that after visiting her her OB-GYN this morning, Dr. Dutton at [facility], she was prompted to visit the ED for evaluation after she informed her OB-GYN that she has \"passed out\" approximately 3 times in the past 3 days. She notes her episodes of \"passing out\" are characterized as a yellowing of her vision with a limping of her body and a lowering of her " "heart rate, but she can still hear what is going on. She denies any associated head trauma. She endorses having a similar \"passing out\" episode once when she was a child, but she denies any other similar episodes of \"passing out\" besides her most recent episodes. She also mentions that she has been having intermittent shortness of breath for a couple of months, but she notes this has worsened and become more persistent since having her first syncopal episode approximately 3 days ago. She states her shortness of breath is worsened with activity, but she notes she still feels short of breath at rest as well. She denies any recent chest pains. She denies any known personal or familial history of cardiac dysrhythmia, coagulopathy, or blood clotting disorders. She denies any known history of pregnancy issues/complications, including preeclampsia.     Per Chart Review: [facility] Emergency Department visit on 09/27/23 for evaluation of nausea and vomiting in pregnancy.  Patient reported vomiting once this morning but not being able to eat or drink anything throughout the day. Associated lightheadedness and dizziness. Was seen in clinic earlier today but was unable to receive fluids so she came to the emergency department. Patient was at least 7 weeks pregnant and was seen 4 days prior for similar symptoms. Patient vital signs were stable and patient was afebrile. Exam with well-appearing female, no reproducible abdominal tenderness. Labs obtained, all unremarkable. Patient was given Zofran and IV fluids.  Patient was discharged with a prescription for Reglan, B6, Unisom.         REVIEW OF SYSTEMS  All other systems negative unless noted in HPI.    PAST MEDICAL HISTORY:  Past Medical History:   Diagnosis Date    Abnormal Pap smear of cervix     Allergic     Gonorrhea     Migraine     Urinary tract infection     Varicella        PAST SURGICAL HISTORY:  Past Surgical History:   Procedure Laterality " Date    BIOPSY BREAST      COLPOSCOPY           CURRENT MEDICATIONS:    No current facility-administered medications for this encounter.     Current Outpatient Medications   Medication Sig Dispense Refill    acetaminophen (TYLENOL) 325 MG tablet [ACETAMINOPHEN (TYLENOL) 325 MG TABLET] Take 1-2 tablets (325-650 mg total) by mouth every 4 (four) hours as needed.  0    docusate sodium (COLACE) 100 MG capsule [DOCUSATE SODIUM (COLACE) 100 MG CAPSULE] Take 1 capsule (100 mg total) by mouth daily.  0    doxylamine (UNISOM SLEEPTABS) 25 MG TABS tablet Take 0.5 tablets (12.5 mg) by mouth every 8 hours as needed for other (nausea/vomiting, taken with B6) 20 tablet 0    ibuprofen (ADVIL,MOTRIN) 800 MG tablet [IBUPROFEN (ADVIL,MOTRIN) 800 MG TABLET] Take 1 tablet (800 mg total) by mouth every 8 (eight) hours. 60 tablet 0    metoclopramide (REGLAN) 10 MG tablet Take 1 tablet (10 mg) by mouth 4 times daily (before meals and nightly) 20 tablet 0    pyridOXINE (VITAMIN B6) 25 MG tablet Take 1 tablet (25 mg) by mouth every 8 hours as needed (nausea) 20 tablet 0         ALLERGIES:  Allergies   Allergen Reactions    Metronidazole Nausea and Vomiting       FAMILY HISTORY:  Family History   Problem Relation Age of Onset    Diabetes Maternal Grandmother     Kidney Disease Mother     Asthma Brother     Hearing Loss Brother        SOCIAL HISTORY:  Social History     Socioeconomic History    Marital status:     Number of children: 2   Tobacco Use    Smoking status: Never    Smokeless tobacco: Never    Tobacco comments:      smokes outside   Substance and Sexual Activity    Alcohol use: No    Drug use: No    Sexual activity: Yes     Partners: Male     Birth control/protection: None     Social Determinants of Health     Financial Resource Strain: Not At Risk (5/15/2023)    [facility]    Financial Resource Strain     Is it hard for you to pay for the very basics like food, housing, medical care  or heating?: No   Food Insecurity: Not At Risk (5/15/2023)    Received from [facility]    Food Insecurity     Does your food run out before you have the money to buy more?: No   Transportation Needs: Not At Risk (5/15/2023)    Received from [facility]    Transportation Needs     Does a lack of transportation keep you from your medical appointments or from getting your medications?: No    [facility], [facility]    Social Connections       VITALS:  No data found.      PHYSICAL EXAM    VITAL SIGNS: BP 93/53   Pulse 73   Temp 98.5  F (36.9  C) (Oral)   Resp 17   Wt 76.2 kg (168 lb)   LMP 08/09/2023 (Exact Date)   SpO2 98%   BMI 30.73 kg/m    Physical Exam  Vitals and nursing note reviewed.   Constitutional:       General: She is not in acute distress.     Appearance: She is not toxic-appearing.   Eyes:      General: No scleral icterus.        Right eye: No discharge.         Left eye: No discharge.   Cardiovascular:      Rate and Rhythm: Normal rate and regular rhythm.   Pulmonary:      Effort: Pulmonary effort is normal. No respiratory distress.      Breath sounds: Normal breath sounds.   Abdominal:      General: There is no distension.      Tenderness: There is no abdominal tenderness.      Comments: Gravid uterus.    Musculoskeletal:         General: No swelling or deformity.      Cervical back: Neck supple. No rigidity.   Skin:     General: Skin is warm and dry.      Capillary Refill: Capillary refill takes less than 2 seconds.      Findings: No bruising or erythema.   Neurological:      General: No focal deficit present.      Mental Status: She is alert and oriented to person, place, and time. Mental status is at baseline.      Comments: No slurred speech, following commands spontaneously. No facial droop.   Psychiatric:         Mood and Affect: Mood normal.         Behavior: Behavior  normal.         LABS  Labs Ordered and Resulted from Time of ED Arrival to Time of ED Departure   D DIMER QUANTITATIVE - Abnormal       Result Value    D-Dimer Quantitative 2.04 (*)    COMPREHENSIVE METABOLIC PANEL - Abnormal    Sodium 136      Potassium 3.9      Carbon Dioxide (CO2) 20 (*)     Anion Gap 13      Urea Nitrogen 8.1      Creatinine 0.56      GFR Estimate >90      Calcium 8.8      Chloride 103      Glucose 86      Alkaline Phosphatase 113      AST 19      ALT 9      Protein Total 6.7      Albumin 3.4 (*)     Bilirubin Total 0.2     CBC WITH PLATELETS AND DIFFERENTIAL - Abnormal    WBC Count 8.6      RBC Count 3.66 (*)     Hemoglobin 11.4 (*)     Hematocrit 33.8 (*)     MCV 92      MCH 31.1      MCHC 33.7      RDW 12.0      Platelet Count        % Neutrophils 73      % Lymphocytes 17      % Monocytes 6      % Eosinophils 1      % Basophils 1      % Immature Granulocytes 2      NRBCs per 100 WBC 0      Absolute Neutrophils 6.4      Absolute Lymphocytes 1.5      Absolute Monocytes 0.5      Absolute Eosinophils 0.1      Absolute Basophils 0.0      Absolute Immature Granulocytes 0.2      Absolute NRBCs 0.0     TROPONIN T, HIGH SENSITIVITY - Normal    Troponin T, High Sensitivity <6     NT PROBNP INPATIENT - Normal    N terminal Pro BNP Inpatient <36           RADIOLOGY  CT Chest Pulmonary Embolism w Contrast   Final Result   IMPRESSION:      No acute pulmonary embolism. No acute lung, airway, or pleural inflammatory process.         I have independently reviewed the above image. See radiology report for detail.      EKG:    EKG obtained at 1055 and shows sinus rhythm rate 109, Qtc 455, compared to previous EKG on NA, none available. No CYNDI or depression. I have independently reviewed and interpreted today's EKG, pending Cardiologist read.       PROCEDURES:  N/A      MEDICATIONS GIVEN IN THE EMERGENCY:  Medications   iopamidol (ISOVUE-370) solution 90 mL (90 mLs Intravenous $Given 4/11/24 6403)       NEW  PRESCRIPTIONS STARTED AT TODAY'S ER VISIT  Discharge Medication List as of 4/11/2024  3:33 PM           I, Sergio Rodríguez, am serving as a scribe to document services personally performed by Meagan Velázquez MD, based on my observations and the provider's statements to me.  I, Meagan Velázquez MD, attest that Sergio Rodríguez is acting in a scribe capacity, has observed my performance of the services and has documented them in accordance with my direction.     Megaan Velázquez MD  Emergency Medicine  [facility] EMERGENCY DEPARTMENT  [address] [place] [id number]  [phone]  Dept: [phone]             Meagan Velázquez MD  04/12/24 8971

## 2024-04-11 NOTE — ED TRIAGE NOTES
Patient arrives to ED from OBGYN. Was seen today regarding lightheadedness and total of four syncopal episodes in past five days. OB provider referred patient to this ED.     Thirty five weeks pregnant.

## 2024-04-11 NOTE — TELEPHONE ENCOUNTER
[facility]    Reason for call: Lab Result Notification     Lab Result (including Rx patient on, if applicable).  If culture, copy of lab report at bottom.  Lab Result:   Component      Latest Ref Rng 4/11/2024  3:10 PM   Color Urine      Colorless, Straw, Light Yellow, Yellow  Light Yellow    Appearance Urine      Clear  Clear    Glucose Urine      Negative mg/dL Negative    Bilirubin Urine      Negative  Negative    Ketones Urine      Negative mg/dL Negative    Specific Gravity Urine      1.001 - 1.030  1.020    Blood Urine      Negative  Negative    pH Urine      5.0 - 7.0  6.0    Protein Albumin Urine      Negative mg/dL 20 !    Nitrite Urine      Negative  Negative    Leukocyte Esterase Urine      Negative  500 Maryellen/uL !    Bacteria Urine      None Seen /HPF Moderate !    RBC Urine      <=2 /HPF 0    WBC Urine      <=5 /HPF 34 (H)    Mucus Urine      None Seen /LPF Present !    Urobilinogen mg/dL      <2.0 mg/dL <2.0    Squamous Epithelial /HPF Urine      <=1 /HPF 11 (H)       Legend:  ! Abnormal  (H) High    Creatinine Level (mg/dl)   Creatinine   Date Value Ref Range Status   04/11/2024 0.56 0.51 - 0.95 mg/dL Final    Creatinine clearance (ml/min), if applicable    Serum creatinine: 0.56 mg/dL 04/11/24 1116  Estimated creatinine clearance: 136.5 mL/min     Spoke with patient, advised her to let her PCP or OB/Gyn provider about her UA. Also told her that we would reach out if her UC comes back positive.  Patient verbalized understanding and agreement with the plan of care.     RN Recommendations/Instructions per [facility] lab result protocol:   [facility] ED lab result protocol utilized: Rocky OLVERA RN

## 2024-04-12 LAB — BACTERIA UR CULT: NORMAL

## 2024-04-14 LAB
ATRIAL RATE - MUSE: 109 BPM
DIASTOLIC BLOOD PRESSURE - MUSE: 63 MMHG
INTERPRETATION ECG - MUSE: NORMAL
P AXIS - MUSE: 60 DEGREES
PR INTERVAL - MUSE: 144 MS
QRS DURATION - MUSE: 66 MS
QT - MUSE: 338 MS
QTC - MUSE: 455 MS
R AXIS - MUSE: 88 DEGREES
SYSTOLIC BLOOD PRESSURE - MUSE: 108 MMHG
T AXIS - MUSE: 15 DEGREES
VENTRICULAR RATE- MUSE: 109 BPM

## 2024-04-17 ENCOUNTER — OFFICE VISIT (OUTPATIENT)
Dept: CARDIOLOGY | Facility: CLINIC | Age: 34
End: 2024-04-17
Attending: EMERGENCY MEDICINE
Payer: COMMERCIAL

## 2024-04-17 VITALS
HEART RATE: 89 BPM | DIASTOLIC BLOOD PRESSURE: 65 MMHG | SYSTOLIC BLOOD PRESSURE: 95 MMHG | RESPIRATION RATE: 20 BRPM | OXYGEN SATURATION: 99 % | BODY MASS INDEX: 30.54 KG/M2 | WEIGHT: 167 LBS

## 2024-04-17 DIAGNOSIS — R55 NEAR SYNCOPE: Primary | ICD-10-CM

## 2024-04-17 DIAGNOSIS — R55 VASOVAGAL SYNCOPE: ICD-10-CM

## 2024-04-17 DIAGNOSIS — O21.9 VOMITING OF PREGNANCY: ICD-10-CM

## 2024-04-17 PROCEDURE — 99243 OFF/OP CNSLTJ NEW/EST LOW 30: CPT | Performed by: STUDENT IN AN ORGANIZED HEALTH CARE EDUCATION/TRAINING PROGRAM

## 2024-04-17 NOTE — PATIENT INSTRUCTIONS
It was a pleasure meeting you today    In summary:    You most likely have vasovagal syncope which can be triggered by dehydration.  We will get an echocardiogram to ensure your heart is functioning normally.    Please call my nurse Barron Mcdermott at [phone] if you have any questions or issues.    We will schedule a follow up visit if needed      Rohan Thornton DO [facility]  Non-invasive Cardiologist  [facility]

## 2024-04-17 NOTE — LETTER
4/17/2024    [facility]  [address] [facility], [address] [address]    RE: Jeremy Flores       Dear Colleague,     I had the pleasure of seeing Jeremy Flores in the [facility].    [facility]   [address]  [place] [address]   [email or web address]   OFFICE: [phone]     CARDIOLOGY CLINIC NOTE     Thank you, Dr. Mac, [facility], for asking the [facility] team to see Ms. Jeremy Flores to evaluate near syncope        History of Present Illness   Ms. Jeremy Flores is a 33 year old female with a significant past history of hyperemesis gravidarum, currently pregnant in 3rd trimester, who presents for evaluation of near syncope.  The patient notes she has had multiple episodes of near syncope described as a yellowing of her vision.  One episode came after talking for a while, and trying to stand up.  Another episode happened in the store when she was walking around and she had to sit down on the ground.  Another episode happened with a lab draw.  She notes this all started after she went back to work.  She was previously treated with IV infusions for the hyperemesis gravidarum.  She did have one episode of passing out in childhood related to not eating.      Other than noted above, Ms. Flores denies any chest pain/pressure/tightness, shortness of breath at rest or with exertion, light headedness/dizziness, lower extremity swelling, palpitations, paroxysmal nocturnal dyspnea (PND), or orthopnea.       Medications  Allergies   Current Outpatient Medications   Medication Sig Dispense Refill    acetaminophen (TYLENOL) 325 MG tablet [ACETAMINOPHEN (TYLENOL) 325 MG TABLET] Take 1-2 tablets (325-650 mg total) by mouth every 4 (four) hours as needed.  0    docusate sodium (COLACE) 100 MG capsule [DOCUSATE SODIUM (COLACE) 100 MG CAPSULE] Take 1 capsule (100 mg total) by mouth daily.  0      Allergies   Allergen Reactions    Metronidazole  Nausea and Vomiting        Physical Examination Review of Systems   Vitals: BP 95/65 (BP Location: Left arm, Patient Position: Sitting, Cuff Size: Adult Regular)   Pulse 89   Resp 20   Wt 75.8 kg (167 lb)   LMP 08/09/2023 (Exact Date)   SpO2 99%   BMI 30.54 kg/m    BMI= Body mass index is 30.54 kg/m .  Wt Readings from Last 3 Encounters:   04/17/24 75.8 kg (167 lb)   04/11/24 76.2 kg (168 lb)   09/27/23 64.9 kg (143 lb)       General: pleasant female. No acute distress.   Neck: No JVD  Lungs: clear to auscultation  COR:  regular rate and rhythm, No murmurs, rubs, or gallops  Extrem: No edema        Please refer above for cardiac ROS details.       Past History     Family History:   Family History   Problem Relation Age of Onset    Diabetes Maternal Grandmother     Kidney Disease Mother     Asthma Brother     Hearing Loss Brother         Social History:   Social History     Socioeconomic History    Marital status:      Spouse name: Not on file    Number of children: 2    Years of education: Not on file    Highest education level: Not on file   Occupational History    Not on file   Tobacco Use    Smoking status: Never     Passive exposure: Current    Smokeless tobacco: Never    Tobacco comments:      smokes outside   Substance and Sexual Activity    Alcohol use: No    Drug use: Never    Sexual activity: Yes     Partners: Male     Birth control/protection: None   Other Topics Concern    Not on file   Social History Narrative    Not on file     Social Determinants of Health     Financial Resource Strain: Not At Risk (5/15/2023)    Received from [facility]    Financial Resource Strain     Is it hard for you to pay for the very basics like food, housing, medical care or heating?: No   Food Insecurity: Not At Risk (5/15/2023)    [facility]    Food Insecurity     Does your food run out before you have the money to buy more?: No   Transportation Needs:  "Not At Risk (5/15/2023)    [facility]    Transportation Needs     Does a lack of transportation keep you from your medical appointments or from getting your medications?: No   Physical Activity: Not on file   Stress: Not on file   Social Connections: Unknown (4/11/2023)    [facility] from [facility], [facility]    Social Connections     Frequency of Communication with Friends and Family: Not on file   Interpersonal Safety: Not on file   Housing Stability: Not on file            Lab Results    Chemistry/lipid CBC Cardiac Enzymes/BNP/TSH/INR   Lab Results   Component Value Date    BUN 8.1 04/11/2024     04/11/2024    CO2 20 (L) 04/11/2024    Lab Results   Component Value Date    WBC 8.6 04/11/2024    HGB 11.4 (L) 04/11/2024    HCT 33.8 (L) 04/11/2024    MCV 92 04/11/2024    PLT  04/11/2024      Comment:      Platelets Clumped-Platelet Count Not Available    No results found for: \"CKTOTAL\", \"CKMB\", \"TROPONINI\", \"BNP\", \"TSH\", \"INR\"       Cardiac Problems and Cardiac Diagnostics     Most Recent Cardiac testing:  ECG Personal interpretation  4/11/2024  ST         Assessment/Recommendations   Assessment:    Ms. Jeremy Flores is a 33 year old female with a significant past history of hyperemesis gravidarum, currently pregnant in 3rd trimester, who presents for evaluation of near syncope.     Near syncope   - Most likely vasovagal episodes.  Suspect she is dehydrated which can contribute.  Encouraged her to increase fluids, consider gatorade or electrolyte solutions.  Consider OTC compression stockings.    - TTE to evaluate heart structure and function   - Slow methodical position changes.  Lay on side if feeling dizzy    RTC on an as needed basis         Rohan Thornton, DO FACC  Non-invasive Cardiologist  [facility]         Thank you for allowing me to participate in the care of your patient.      Sincerely, "     Rohan Thornton DO     [facility]  cc:   Meagan Velázquez MD  [facility]  [facility] [place] [address]

## 2024-04-17 NOTE — PROGRESS NOTES
[facility] HEART CARE   [address]  [place] [address]   [email or web address]   OFFICE: [phone]     CARDIOLOGY CLINIC NOTE     Thank you, Dr. Mac, [facility], for asking the [facility] team to see Ms. Jeremy Flores to evaluate near syncope        History of Present Illness   Ms. Jeremy Flores is a 33 year old female with a significant past history of hyperemesis gravidarum, currently pregnant in 3rd trimester, who presents for evaluation of near syncope.  The patient notes she has had multiple episodes of near syncope described as a yellowing of her vision.  One episode came after talking for a while, and trying to stand up.  Another episode happened in the store when she was walking around and she had to sit down on the ground.  Another episode happened with a lab draw.  She notes this all started after she went back to work.  She was previously treated with IV infusions for the hyperemesis gravidarum.  She did have one episode of passing out in childhood related to not eating.      Other than noted above, Ms. Flores denies any chest pain/pressure/tightness, shortness of breath at rest or with exertion, light headedness/dizziness, lower extremity swelling, palpitations, paroxysmal nocturnal dyspnea (PND), or orthopnea.       Medications  Allergies   Current Outpatient Medications   Medication Sig Dispense Refill    acetaminophen (TYLENOL) 325 MG tablet [ACETAMINOPHEN (TYLENOL) 325 MG TABLET] Take 1-2 tablets (325-650 mg total) by mouth every 4 (four) hours as needed.  0    docusate sodium (COLACE) 100 MG capsule [DOCUSATE SODIUM (COLACE) 100 MG CAPSULE] Take 1 capsule (100 mg total) by mouth daily.  0      Allergies   Allergen Reactions    Metronidazole Nausea and Vomiting        Physical Examination Review of Systems   Vitals: BP 95/65 (BP Location: Left arm, Patient Position: Sitting, Cuff Size: Adult Regular)   Pulse 89   Resp 20   Wt 75.8 kg (167 lb)    LMP 08/09/2023 (Exact Date)   SpO2 99%   BMI 30.54 kg/m    BMI= Body mass index is 30.54 kg/m .  Wt Readings from Last 3 Encounters:   04/17/24 75.8 kg (167 lb)   04/11/24 76.2 kg (168 lb)   09/27/23 64.9 kg (143 lb)       General: pleasant female. No acute distress.   Neck: No JVD  Lungs: clear to auscultation  COR:  regular rate and rhythm, No murmurs, rubs, or gallops  Extrem: No edema        Please refer above for cardiac ROS details.       Past History     Family History:   Family History   Problem Relation Age of Onset    Diabetes Maternal Grandmother     Kidney Disease Mother     Asthma Brother     Hearing Loss Brother         Social History:   Social History     Socioeconomic History    Marital status:      Spouse name: Not on file    Number of children: 2    Years of education: Not on file    Highest education level: Not on file   Occupational History    Not on file   Tobacco Use    Smoking status: Never     Passive exposure: Current    Smokeless tobacco: Never    Tobacco comments:      smokes outside   Substance and Sexual Activity    Alcohol use: No    Drug use: Never    Sexual activity: Yes     Partners: Male     Birth control/protection: None   Other Topics Concern    Not on file   Social History Narrative    Not on file     Social Determinants of Health     Financial Resource Strain: Not At Risk (5/15/2023)    Received from [employer]    Financial Resource Strain     Is it hard for you to pay for the very basics like food, housing, medical care or heating?: No   Food Insecurity: Not At Risk (5/15/2023)    Received from [employer] [facility]    Food Insecurity     Does your food run out before you have the money to buy more?: No   Transportation Needs: Not At Risk (5/15/2023)    Received from [employer]    Transportation Needs     Does a lack of transportation keep you from your medical appointments or from getting your medications?: No  "  Physical Activity: Not on file   Stress: Not on file   Social Connections: Unknown (4/11/2023)    Received from [facility] [facility] [facility]    Social Connections     Frequency of Communication with Friends and Family: Not on file   Interpersonal Safety: Not on file   Housing Stability: Not on file            Lab Results    Chemistry/lipid CBC Cardiac Enzymes/BNP/TSH/INR   Lab Results   Component Value Date    BUN 8.1 04/11/2024     04/11/2024    CO2 20 (L) 04/11/2024    Lab Results   Component Value Date    WBC 8.6 04/11/2024    HGB 11.4 (L) 04/11/2024    HCT 33.8 (L) 04/11/2024    MCV 92 04/11/2024    PLT  04/11/2024      Comment:      Platelets Clumped-Platelet Count Not Available    No results found for: \"CKTOTAL\", \"CKMB\", \"TROPONINI\", \"BNP\", \"TSH\", \"INR\"       Cardiac Problems and Cardiac Diagnostics     Most Recent Cardiac testing:  ECG Personal interpretation  4/11/2024  ST         Assessment/Recommendations   Assessment:    Ms. Jeremy Flores is a 33 year old female with a significant past history of hyperemesis gravidarum, currently pregnant in 3rd trimester, who presents for evaluation of near syncope.     Near syncope   - Most likely vasovagal episodes.  Suspect she is dehydrated which can contribute.  Encouraged her to increase fluids, consider gatorade or electrolyte solutions.  Consider OTC compression stockings.    - TTE to evaluate heart structure and function   - Slow methodical position changes.  Lay on side if feeling dizzy    RTC on an as needed basis         Rohan Thornton, DO FACC  Non-invasive Cardiologist  [facility]       "

## 2024-05-01 ENCOUNTER — TRANSFERRED RECORDS (OUTPATIENT)
Dept: HEALTH INFORMATION MANAGEMENT | Facility: CLINIC | Age: 34
End: 2024-05-01
Payer: COMMERCIAL

## 2024-05-04 LAB — GROUP B STREPTOCOCCUS (EXTERNAL): NEGATIVE

## 2024-05-15 ENCOUNTER — HOSPITAL ENCOUNTER (OUTPATIENT)
Facility: HOSPITAL | Age: 34
Discharge: HOME | End: 2024-05-15
Attending: REGISTERED NURSE | Admitting: REGISTERED NURSE
Payer: COMMERCIAL

## 2024-05-15 ENCOUNTER — HOSPITAL ENCOUNTER (INPATIENT)
Facility: HOSPITAL | Age: 34
LOS: 2 days | Discharge: HOME | End: 2024-05-17
Attending: REGISTERED NURSE | Admitting: REGISTERED NURSE
Payer: COMMERCIAL

## 2024-05-15 ENCOUNTER — ANESTHESIA (OUTPATIENT)
Dept: OBGYN | Facility: HOSPITAL | Age: 34
End: 2024-05-15
Payer: COMMERCIAL

## 2024-05-15 ENCOUNTER — ANESTHESIA EVENT (OUTPATIENT)
Dept: OBGYN | Facility: HOSPITAL | Age: 34
End: 2024-05-15
Payer: COMMERCIAL

## 2024-05-15 VITALS
TEMPERATURE: 97.8 F | OXYGEN SATURATION: 98 % | RESPIRATION RATE: 16 BRPM | DIASTOLIC BLOOD PRESSURE: 73 MMHG | SYSTOLIC BLOOD PRESSURE: 110 MMHG

## 2024-05-15 PROBLEM — Z36.89 ENCOUNTER FOR TRIAGE IN PREGNANT PATIENT: Status: ACTIVE | Noted: 2024-05-15

## 2024-05-15 PROBLEM — Z34.90 TERM PREGNANCY: Status: ACTIVE | Noted: 2024-05-15

## 2024-05-15 LAB
ABO/RH(D): NORMAL
ANTIBODY SCREEN: NEGATIVE
ERYTHROCYTE [DISTWIDTH] IN BLOOD BY AUTOMATED COUNT: 12.5 % (ref 10–15)
HCT VFR BLD AUTO: 30.9 % (ref 35–47)
HGB BLD-MCNC: 10.1 G/DL (ref 11.7–15.7)
MCH RBC QN AUTO: 30.2 PG (ref 26.5–33)
MCHC RBC AUTO-ENTMCNC: 32.7 G/DL (ref 31.5–36.5)
MCV RBC AUTO: 93 FL (ref 78–100)
PLATELET # BLD AUTO: 162 10E3/UL (ref 150–450)
RBC # BLD AUTO: 3.34 10E6/UL (ref 3.8–5.2)
SPECIMEN EXPIRATION DATE: NORMAL
WBC # BLD AUTO: 13.2 10E3/UL (ref 4–11)

## 2024-05-15 PROCEDURE — 250N000011 HC RX IP 250 OP 636: Mod: JZ | Performed by: REGISTERED NURSE

## 2024-05-15 PROCEDURE — 250N000009 HC RX 250: Performed by: ANESTHESIOLOGY

## 2024-05-15 PROCEDURE — 250N000009 HC RX 250: Performed by: REGISTERED NURSE

## 2024-05-15 PROCEDURE — 250N000013 HC RX MED GY IP 250 OP 250 PS 637: Performed by: OBSTETRICS & GYNECOLOGY

## 2024-05-15 PROCEDURE — 370N000003 HC ANESTHESIA WARD SERVICE: Performed by: ANESTHESIOLOGY

## 2024-05-15 PROCEDURE — 00HU33Z INSERTION OF INFUSION DEVICE INTO SPINAL CANAL, PERCUTANEOUS APPROACH: ICD-10-PCS | Performed by: ANESTHESIOLOGY

## 2024-05-15 PROCEDURE — 86900 BLOOD TYPING SEROLOGIC ABO: CPT | Performed by: REGISTERED NURSE

## 2024-05-15 PROCEDURE — 3E0R3BZ INTRODUCTION OF ANESTHETIC AGENT INTO SPINAL CANAL, PERCUTANEOUS APPROACH: ICD-10-PCS | Performed by: ANESTHESIOLOGY

## 2024-05-15 PROCEDURE — 36415 COLL VENOUS BLD VENIPUNCTURE: CPT | Performed by: REGISTERED NURSE

## 2024-05-15 PROCEDURE — 120N000001 HC R&B MED SURG/OB

## 2024-05-15 PROCEDURE — 85041 AUTOMATED RBC COUNT: CPT | Performed by: REGISTERED NURSE

## 2024-05-15 PROCEDURE — 250N000011 HC RX IP 250 OP 636: Performed by: OBSTETRICS & GYNECOLOGY

## 2024-05-15 PROCEDURE — 250N000011 HC RX IP 250 OP 636: Performed by: ANESTHESIOLOGY

## 2024-05-15 PROCEDURE — 86780 TREPONEMA PALLIDUM: CPT | Performed by: REGISTERED NURSE

## 2024-05-15 PROCEDURE — G0463 HOSPITAL OUTPT CLINIC VISIT: HCPCS

## 2024-05-15 PROCEDURE — 258N000003 HC RX IP 258 OP 636: Performed by: REGISTERED NURSE

## 2024-05-15 PROCEDURE — 258N000003 HC RX IP 258 OP 636: Performed by: OBSTETRICS & GYNECOLOGY

## 2024-05-15 RX ORDER — LIDOCAINE 40 MG/G
CREAM TOPICAL
Status: DISCONTINUED | OUTPATIENT
Start: 2024-05-15 | End: 2024-05-16 | Stop reason: HOSPADM

## 2024-05-15 RX ORDER — TERBUTALINE SULFATE 1 MG/ML
0.25 INJECTION, SOLUTION SUBCUTANEOUS
Status: DISCONTINUED | OUTPATIENT
Start: 2024-05-15 | End: 2024-05-16 | Stop reason: HOSPADM

## 2024-05-15 RX ORDER — ACETAMINOPHEN 500 MG
1000 TABLET ORAL ONCE
Status: COMPLETED | OUTPATIENT
Start: 2024-05-16 | End: 2024-05-15

## 2024-05-15 RX ORDER — METOCLOPRAMIDE HYDROCHLORIDE 5 MG/ML
10 INJECTION INTRAMUSCULAR; INTRAVENOUS EVERY 6 HOURS PRN
Status: DISCONTINUED | OUTPATIENT
Start: 2024-05-15 | End: 2024-05-16 | Stop reason: HOSPADM

## 2024-05-15 RX ORDER — LIDOCAINE 40 MG/G
CREAM TOPICAL
Status: DISCONTINUED | OUTPATIENT
Start: 2024-05-15 | End: 2024-05-15 | Stop reason: HOSPADM

## 2024-05-15 RX ORDER — SODIUM CHLORIDE, SODIUM LACTATE, POTASSIUM CHLORIDE, CALCIUM CHLORIDE 600; 310; 30; 20 MG/100ML; MG/100ML; MG/100ML; MG/100ML
INJECTION, SOLUTION INTRAVENOUS CONTINUOUS
Status: DISCONTINUED | OUTPATIENT
Start: 2024-05-15 | End: 2024-05-16

## 2024-05-15 RX ORDER — NALOXONE HYDROCHLORIDE 0.4 MG/ML
0.2 INJECTION, SOLUTION INTRAMUSCULAR; INTRAVENOUS; SUBCUTANEOUS
Status: DISCONTINUED | OUTPATIENT
Start: 2024-05-15 | End: 2024-05-16 | Stop reason: HOSPADM

## 2024-05-15 RX ORDER — NALOXONE HYDROCHLORIDE 0.4 MG/ML
0.4 INJECTION, SOLUTION INTRAMUSCULAR; INTRAVENOUS; SUBCUTANEOUS
Status: DISCONTINUED | OUTPATIENT
Start: 2024-05-15 | End: 2024-05-16 | Stop reason: HOSPADM

## 2024-05-15 RX ORDER — ONDANSETRON 2 MG/ML
4 INJECTION INTRAMUSCULAR; INTRAVENOUS EVERY 6 HOURS PRN
Status: DISCONTINUED | OUTPATIENT
Start: 2024-05-15 | End: 2024-05-16 | Stop reason: HOSPADM

## 2024-05-15 RX ORDER — FENTANYL CITRATE 50 UG/ML
100 INJECTION, SOLUTION INTRAMUSCULAR; INTRAVENOUS
Status: DISCONTINUED | OUTPATIENT
Start: 2024-05-15 | End: 2024-05-16 | Stop reason: HOSPADM

## 2024-05-15 RX ORDER — OXYTOCIN 10 [USP'U]/ML
10 INJECTION, SOLUTION INTRAMUSCULAR; INTRAVENOUS
Status: DISCONTINUED | OUTPATIENT
Start: 2024-05-15 | End: 2024-05-17 | Stop reason: HOSPADM

## 2024-05-15 RX ORDER — LOPERAMIDE HCL 2 MG
4 CAPSULE ORAL
Status: DISCONTINUED | OUTPATIENT
Start: 2024-05-15 | End: 2024-05-16 | Stop reason: HOSPADM

## 2024-05-15 RX ORDER — TRANEXAMIC ACID 10 MG/ML
1 INJECTION, SOLUTION INTRAVENOUS EVERY 30 MIN PRN
Status: DISCONTINUED | OUTPATIENT
Start: 2024-05-15 | End: 2024-05-16 | Stop reason: HOSPADM

## 2024-05-15 RX ORDER — KETOROLAC TROMETHAMINE 30 MG/ML
30 INJECTION, SOLUTION INTRAMUSCULAR; INTRAVENOUS
Status: COMPLETED | OUTPATIENT
Start: 2024-05-15 | End: 2024-05-16

## 2024-05-15 RX ORDER — METHYLERGONOVINE MALEATE 0.2 MG/ML
200 INJECTION INTRAVENOUS
Status: DISCONTINUED | OUTPATIENT
Start: 2024-05-15 | End: 2024-05-16 | Stop reason: HOSPADM

## 2024-05-15 RX ORDER — LIDOCAINE HYDROCHLORIDE AND EPINEPHRINE 15; 5 MG/ML; UG/ML
INJECTION, SOLUTION EPIDURAL PRN
Status: DISCONTINUED | OUTPATIENT
Start: 2024-05-15 | End: 2024-05-16

## 2024-05-15 RX ORDER — FENTANYL CITRATE-0.9 % NACL/PF 10 MCG/ML
100 PLASTIC BAG, INJECTION (ML) INTRAVENOUS EVERY 5 MIN PRN
Status: DISCONTINUED | OUTPATIENT
Start: 2024-05-15 | End: 2024-05-16 | Stop reason: HOSPADM

## 2024-05-15 RX ORDER — ONDANSETRON 4 MG/1
4 TABLET, ORALLY DISINTEGRATING ORAL EVERY 6 HOURS PRN
Status: DISCONTINUED | OUTPATIENT
Start: 2024-05-15 | End: 2024-05-16 | Stop reason: HOSPADM

## 2024-05-15 RX ORDER — NALBUPHINE HYDROCHLORIDE 20 MG/ML
2.5-5 INJECTION, SOLUTION INTRAMUSCULAR; INTRAVENOUS; SUBCUTANEOUS EVERY 6 HOURS PRN
Status: DISCONTINUED | OUTPATIENT
Start: 2024-05-15 | End: 2024-05-17 | Stop reason: HOSPADM

## 2024-05-15 RX ORDER — MISOPROSTOL 200 UG/1
400 TABLET ORAL
Status: DISCONTINUED | OUTPATIENT
Start: 2024-05-15 | End: 2024-05-16 | Stop reason: HOSPADM

## 2024-05-15 RX ORDER — PROCHLORPERAZINE 25 MG
25 SUPPOSITORY, RECTAL RECTAL EVERY 12 HOURS PRN
Status: DISCONTINUED | OUTPATIENT
Start: 2024-05-15 | End: 2024-05-16 | Stop reason: HOSPADM

## 2024-05-15 RX ORDER — LOPERAMIDE HCL 2 MG
2 CAPSULE ORAL
Status: DISCONTINUED | OUTPATIENT
Start: 2024-05-15 | End: 2024-05-16 | Stop reason: HOSPADM

## 2024-05-15 RX ORDER — CITRIC ACID/SODIUM CITRATE 334-500MG
30 SOLUTION, ORAL ORAL
Status: DISCONTINUED | OUTPATIENT
Start: 2024-05-15 | End: 2024-05-16 | Stop reason: HOSPADM

## 2024-05-15 RX ORDER — MISOPROSTOL 200 UG/1
800 TABLET ORAL
Status: DISCONTINUED | OUTPATIENT
Start: 2024-05-15 | End: 2024-05-16 | Stop reason: HOSPADM

## 2024-05-15 RX ORDER — IBUPROFEN 800 MG/1
800 TABLET, FILM COATED ORAL
Status: COMPLETED | OUTPATIENT
Start: 2024-05-15 | End: 2024-05-16

## 2024-05-15 RX ORDER — OXYTOCIN/0.9 % SODIUM CHLORIDE 30/500 ML
340 PLASTIC BAG, INJECTION (ML) INTRAVENOUS CONTINUOUS PRN
Status: DISCONTINUED | OUTPATIENT
Start: 2024-05-15 | End: 2024-05-16 | Stop reason: HOSPADM

## 2024-05-15 RX ORDER — FENTANYL/ROPIVACAINE/NS/PF 2MCG/ML-.1
PLASTIC BAG, INJECTION (ML) EPIDURAL
Status: DISCONTINUED | OUTPATIENT
Start: 2024-05-15 | End: 2024-05-16 | Stop reason: HOSPADM

## 2024-05-15 RX ORDER — OXYTOCIN 10 [USP'U]/ML
10 INJECTION, SOLUTION INTRAMUSCULAR; INTRAVENOUS
Status: DISCONTINUED | OUTPATIENT
Start: 2024-05-15 | End: 2024-05-16 | Stop reason: HOSPADM

## 2024-05-15 RX ORDER — FENTANYL CITRATE-0.9 % NACL/PF 10 MCG/ML
100 PLASTIC BAG, INJECTION (ML) INTRAVENOUS EVERY 5 MIN PRN
Status: DISCONTINUED | OUTPATIENT
Start: 2024-05-15 | End: 2024-05-15

## 2024-05-15 RX ORDER — AMPICILLIN 2 G/1
2 INJECTION, POWDER, FOR SOLUTION INTRAVENOUS EVERY 6 HOURS
Status: DISCONTINUED | OUTPATIENT
Start: 2024-05-15 | End: 2024-05-16

## 2024-05-15 RX ORDER — SODIUM CHLORIDE, SODIUM LACTATE, POTASSIUM CHLORIDE, CALCIUM CHLORIDE 600; 310; 30; 20 MG/100ML; MG/100ML; MG/100ML; MG/100ML
INJECTION, SOLUTION INTRAVENOUS CONTINUOUS PRN
Status: DISCONTINUED | OUTPATIENT
Start: 2024-05-15 | End: 2024-05-16 | Stop reason: HOSPADM

## 2024-05-15 RX ORDER — METOCLOPRAMIDE 10 MG/1
10 TABLET ORAL EVERY 6 HOURS PRN
Status: DISCONTINUED | OUTPATIENT
Start: 2024-05-15 | End: 2024-05-16 | Stop reason: HOSPADM

## 2024-05-15 RX ORDER — OXYTOCIN/0.9 % SODIUM CHLORIDE 30/500 ML
100-340 PLASTIC BAG, INJECTION (ML) INTRAVENOUS CONTINUOUS PRN
Status: DISCONTINUED | OUTPATIENT
Start: 2024-05-15 | End: 2024-05-17 | Stop reason: HOSPADM

## 2024-05-15 RX ORDER — PROCHLORPERAZINE MALEATE 10 MG
10 TABLET ORAL EVERY 6 HOURS PRN
Status: DISCONTINUED | OUTPATIENT
Start: 2024-05-15 | End: 2024-05-16 | Stop reason: HOSPADM

## 2024-05-15 RX ORDER — OXYTOCIN/0.9 % SODIUM CHLORIDE 30/500 ML
1-24 PLASTIC BAG, INJECTION (ML) INTRAVENOUS CONTINUOUS
Status: DISCONTINUED | OUTPATIENT
Start: 2024-05-15 | End: 2024-05-16 | Stop reason: HOSPADM

## 2024-05-15 RX ORDER — CARBOPROST TROMETHAMINE 250 UG/ML
250 INJECTION, SOLUTION INTRAMUSCULAR
Status: DISCONTINUED | OUTPATIENT
Start: 2024-05-15 | End: 2024-05-16 | Stop reason: HOSPADM

## 2024-05-15 RX ORDER — LIDOCAINE HYDROCHLORIDE 10 MG/ML
INJECTION, SOLUTION EPIDURAL; INFILTRATION; INTRACAUDAL; PERINEURAL PRN
Status: DISCONTINUED | OUTPATIENT
Start: 2024-05-15 | End: 2024-05-16

## 2024-05-15 RX ADMIN — LIDOCAINE HYDROCHLORIDE,EPINEPHRINE BITARTRATE 2 ML: 15; .005 INJECTION, SOLUTION EPIDURAL; INFILTRATION; INTRACAUDAL; PERINEURAL at 14:42

## 2024-05-15 RX ADMIN — AMPICILLIN SODIUM 2 G: 2 INJECTION, POWDER, FOR SOLUTION INTRAMUSCULAR; INTRAVENOUS at 23:06

## 2024-05-15 RX ADMIN — GENTAMICIN SULFATE 380 MG: 40 INJECTION, SOLUTION INTRAMUSCULAR; INTRAVENOUS at 23:34

## 2024-05-15 RX ADMIN — ACETAMINOPHEN 1000 MG: 500 TABLET ORAL at 23:41

## 2024-05-15 RX ADMIN — SODIUM CHLORIDE, POTASSIUM CHLORIDE, SODIUM LACTATE AND CALCIUM CHLORIDE: 600; 310; 30; 20 INJECTION, SOLUTION INTRAVENOUS at 16:58

## 2024-05-15 RX ADMIN — Medication 2 MILLI-UNITS/MIN: at 18:26

## 2024-05-15 RX ADMIN — SODIUM CHLORIDE, POTASSIUM CHLORIDE, SODIUM LACTATE AND CALCIUM CHLORIDE: 600; 310; 30; 20 INJECTION, SOLUTION INTRAVENOUS at 21:58

## 2024-05-15 RX ADMIN — METOCLOPRAMIDE 10 MG: 5 INJECTION, SOLUTION INTRAMUSCULAR; INTRAVENOUS at 16:51

## 2024-05-15 RX ADMIN — LIDOCAINE HYDROCHLORIDE,EPINEPHRINE BITARTRATE 3 ML: 15; .005 INJECTION, SOLUTION EPIDURAL; INFILTRATION; INTRACAUDAL; PERINEURAL at 14:37

## 2024-05-15 RX ADMIN — Medication: at 14:42

## 2024-05-15 RX ADMIN — LIDOCAINE HYDROCHLORIDE 2 ML: 10 INJECTION, SOLUTION EPIDURAL; INFILTRATION; INTRACAUDAL; PERINEURAL at 14:42

## 2024-05-15 RX ADMIN — SODIUM CHLORIDE, POTASSIUM CHLORIDE, SODIUM LACTATE AND CALCIUM CHLORIDE 1000 ML: 600; 310; 30; 20 INJECTION, SOLUTION INTRAVENOUS at 13:26

## 2024-05-15 ASSESSMENT — ACTIVITIES OF DAILY LIVING (ADL)
ADLS_ACUITY_SCORE: 18
ADLS_ACUITY_SCORE: 35
ADLS_ACUITY_SCORE: 18
ADLS_ACUITY_SCORE: 18
ADLS_ACUITY_SCORE: 35
ADLS_ACUITY_SCORE: 18

## 2024-05-15 NOTE — PROGRESS NOTES
Patient returned to hospital with stronger contractions after recent discharge. Contractions q2-3mins, moderate/strong on palpation. Patient coping well, requests epidural.

## 2024-05-15 NOTE — ANESTHESIA PREPROCEDURE EVALUATION
Anesthesia Pre-Procedure Evaluation    Patient: Jeremy Flores   MRN: [id number] : 1990        Procedure : * No procedures listed *          Past Medical History:   Diagnosis Date    Abnormal Pap smear of cervix     Allergic     Gonorrhea     Migraine     Urinary tract infection     Varicella       Past Surgical History:   Procedure Laterality Date    APPENDECTOMY          BIOPSY BREAST      COLPOSCOPY        Allergies   Allergen Reactions    Metronidazole Nausea and Vomiting      Social History     Tobacco Use    Smoking status: Never     Passive exposure: Past    Smokeless tobacco: Never    Tobacco comments:      smokes outside   Substance Use Topics    Alcohol use: No      Wt Readings from Last 1 Encounters:   05/15/24 77.1 kg (170 lb)        Anesthesia Evaluation            ROS/MED HX  ENT/Pulmonary:  - neg pulmonary ROS     Neurologic:  - neg neurologic ROS     Cardiovascular:  - neg cardiovascular ROS     METS/Exercise Tolerance:     Hematologic:  - neg hematologic  ROS     Musculoskeletal:       GI/Hepatic:  - neg GI/hepatic ROS     Renal/Genitourinary:       Endo:  - neg endo ROS     Psychiatric/Substance Use:  - neg psychiatric ROS     Infectious Disease:       Malignancy:       Other:            Physical Exam    Airway        Mallampati: II   TM distance: > 3 FB   Neck ROM: full   Mouth opening: > 3 cm    Respiratory Devices and Support         Dental  no notable dental history         Cardiovascular   cardiovascular exam normal          Pulmonary   pulmonary exam normal                OUTSIDE LABS:  CBC:   Lab Results   Component Value Date    WBC 8.6 2024    WBC 10.9 2023    HGB 11.4 (L) 2024    HGB 12.9 2023    HCT 33.8 (L) 2024    HCT 37.5 2023    PLT  2024      Comment:      Platelets Clumped-Platelet Count Not Available     2023     BMP:   Lab Results   Component Value Date     2024     2023    POTASSIUM 3.9  "04/11/2024    POTASSIUM 3.7 09/27/2023    CHLORIDE 103 04/11/2024    CHLORIDE 101 09/27/2023    CO2 20 (L) 04/11/2024    CO2 25 09/27/2023    BUN 8.1 04/11/2024    BUN 9.0 09/27/2023    CR 0.56 04/11/2024    CR 0.66 09/27/2023    GLC 86 04/11/2024    GLC 96 09/27/2023     COAGS: No results found for: \"PTT\", \"INR\", \"FIBR\"  POC:   Lab Results   Component Value Date    HCG Positive (A) 01/19/2018     HEPATIC:   Lab Results   Component Value Date    ALBUMIN 3.4 (L) 04/11/2024    PROTTOTAL 6.7 04/11/2024    ALT 9 04/11/2024    AST 19 04/11/2024    ALKPHOS 113 04/11/2024    BILITOTAL 0.2 04/11/2024     OTHER:   Lab Results   Component Value Date    MERVIN 8.8 04/11/2024    LIPASE 44 02/16/2023       Anesthesia Plan    ASA Status:  2       Anesthesia Type: Epidural.              Consents    Anesthesia Plan(s) and associated risks, benefits, and realistic alternatives discussed. Questions answered and patient/representative(s) expressed understanding.     - Discussed:     - Discussed with:  Patient            Postoperative Care            Comments:    Other Comments: Patient requests labor analgesia. Chart reviewed, including labs. I discussed the options and risks with thepatient, including but not limited to: bleeding, infection, tissue injury to nerve, muscle or blood vessel, hypotension, headache and epidural failure. All patient questions answered and the consent signed. Patient agrees to elective epidural placement.        neg OB ROS.      Coleman Rose MD    I have reviewed the pertinent notes and labs in the chart from the past 30 days and (re)examined the patient.  Any updates or changes from those notes are reflected in this note.                  "

## 2024-05-15 NOTE — PLAN OF CARE
Problem: Adult Inpatient Plan of Care  Goal: Optimal Comfort and Wellbeing  Outcome: Progressing  Intervention: Provide Person-Centered Care  Recent Flowsheet Documentation  Taken 5/15/2024 1300 by Emani Paris RN  Trust Relationship/Rapport:   care explained   choices provided   emotional support provided   empathic listening provided   questions answered   questions encouraged   reassurance provided   thoughts/feelings acknowledged     Problem: Labor  Goal: Effective Progression to Delivery  Outcome: Progressing     Problem: Labor  Goal: Acceptable Pain Control  Outcome: Progressing   Goal Outcome Evaluation:      Plan of Care Reviewed With: patient, significant other    Overall Patient Progress: improvingOverall Patient Progress: improving    Outcome Evaluation: Patient has been resting comfortably/sleeping with epidural. AROM at 1508 with clear fluid, 6-7cm at that time.

## 2024-05-15 NOTE — PROGRESS NOTES
Outpatient/Triage Note:    Patient Name:  Jeremy Flores  :      1990  MRN:      [id number]      Assessment:   @ 40w0d here for evaluation of contractions.     Plan:   -Cervix with slight change from clinic exam last week. Membranes swept per Jeremy's preference. Would like to discharge to labor at home.   - Discharge to home undelivered. Reviewed warning signs including decreased fetal movement, leaking of fluid, vaginal bleeding, or signs of labor. Reviewed how to contact on-call provider. Follow-up in clinic with OB provider as scheduled or sooner as needed. All questions answered. Agrees with plan.     Subjective:  Jeremy Flores is a 33 year old  at 40 weeks, who presented to [facility] for evaluation of contractions since around 0700, still irregular in nature. Denies leaking of fluid, bleeding, or changes in fetal movement.     Objective:  Vital signs: /73 (BP Location: Right arm, Patient Position: Semi-Mayberry's, Cuff Size: Adult Regular)   Temp 97.8  F (36.6  C) (Oral)   Resp 16   LMP 2023 (Exact Date)   SpO2 98%   FHR: Category 1, baseline 130, moderate variability, accelerations present, decelerations absent  Uterine contractions: 3-5, mild by palation    Physical Exam: A&Ox3  Abdomen: SIUP, abdomen non-tender  SVE: 4/-3, bloody show present  Legs: No edema, moves freely      Results:  No results found for this or any previous visit (from the past 168 hour(s)).      Provider: Angelia العراقي CNM

## 2024-05-15 NOTE — H&P
"HISTORY AND PHYSICAL UPDATE ADMISSION EXAM    Name: Jeremy Flores  YOB: 1990  Medical Record Number: [id number]    History of Present Illness: Jeremy Flores is a 33 year old female who is 40w0d pregnant and being admitted for active labor management. She was seen earlier this morning for triage and was 4/80/-3. Membranes were swept at that time. Able to go home to rest for a couple hours but returns now with strong regular contractions.    Estimated Date of Delivery: May 15, 2024    EGA 40w0d    OB History    Para Term  AB Living   6 4 4 0 1 4   SAB IAB Ectopic Multiple Live Births   1 0 0 0 4      # Outcome Date GA Lbr Jose M/2nd Weight Sex Type Anes PTL Lv   6 Current            5 Term 20 40w4d 04:51 / 00:08 3.7 kg (8 lb 2.5 oz) M Vag-Spont EPI N CLARI      Complications: Shoulder Dystocia      Name: TIMMYMALE-JEREMY      Apgar1: 8  Apgar5: 9   4 Term 18 40w4d  3.118 kg (6 lb 14 oz) F Vag-Spont EPI N CLARI      Name: TIMMYFEMALE-JEREMY      Apgar1: 8  Apgar5: 9   3 Term 13 40w0d  3.402 kg (7 lb 8 oz) F Vag-Spont EPI N CLARI      Name: Sammi Chavarria   2 Term 11 40w0d  3.374 kg (7 lb 7 oz) F Vag-Spont EPI N CLARI      Name: Jaclyn Chavarria   1 2010 2w0d               Lab Results   Component Value Date    AS Negative 2018    HEPBANG Negative 2018    GCPCRT Negative 2018    HGB 11.4 (L) 2024       Prenatal Complications:     1) Persistent N/V without diagnosis of hyperemesis  2) GCT- 139  3) Anemia-10.1 on admission     Exam:      /69 (BP Location: Right arm, Patient Position: Semi-Mayberry's, Cuff Size: Adult Regular)   Temp (P) 98.1  F (36.7  C) (Oral)   Resp 18   Ht 1.549 m (5' 1\")   Wt 77.1 kg (170 lb)   LMP 2023 (Exact Date)   BMI 32.12 kg/m      Fetal heart Rate Category 1  Contractions 3-4 minutes    HEENT grossly normal  Neck: no lymphadenopathy or thryoidomegaly  Lungs CTAB  Heart RRR  ABD gravid, non-tender  EXT:  No edema, moves freely  Vaginal exam " 480/-3 when discharged earlier this morning  Membranes: intact    Assessment: active labor management  GBS negative  AB positive    Plan: Admit - see IP orders  Pain medication as desired. Planning epidural prior to admission cervical exam due to discomfort.   N/V medication management PRN  Anticipate   Active management of third stage    Prenatal record reviewed.    ANDREWS Garcia Dr. aware of patient status and remains available for consultation and collaboration as needed.  5/15/2024   1:56 PM

## 2024-05-15 NOTE — ANESTHESIA PROCEDURE NOTES
"Epidural catheter Procedure Note    Pre-Procedure   Staff -        Anesthesiologist:  Coleman Rose MD       Performed By: anesthesiologist       Location: OB       Procedure Start/Stop Times: 5/15/2024 2:27 PM and 5/15/2024 2:44 PM       Pre-Anesthestic Checklist: patient identified, IV checked, risks and benefits discussed, informed consent, monitors and equipment checked and pre-op evaluation  Timeout:       Correct Patient: Yes        Correct Procedure: Yes        Correct Site: Yes        Correct Position: Yes   Procedure Documentation  Procedure: epidural catheter       Patient Position: sitting       Patient Prep/Sterile Barriers: sterile gloves, mask, patient draped       Skin prep: Chloraprep       Local skin infiltrated with 2 mL of 1% lidocaine.        Insertion Site: L3-4. (midline approach).       Technique: LORT air        Needle Type: [employer]       Needle Gauge: 18.        Needle Length (Inches): 3.5        Catheter: 20 G.          Catheter threaded easily.           Threaded 10.5 cm at skin.         # of attempts: 1 and  # of redirects:  0    Assessment/Narrative         Paresthesias: No.       Test dose of 3 mL lidocaine 1.5% w/ 1:200,000 epinephrine at 14:37 CDT.         Test dose negative, 3 minutes after injection, for signs of intravascular, subdural, or intrathecal injection.       Insertion/Infusion Method: LORT air       Aspiration negative for Heme or CSF via Epidural Catheter.    Medication(s) Administered   Medication Administration Time: 5/15/2024 2:27 PM      FOR [facility] ONLY:   Pain Team Contact information: please page the Pain Team Via "[employer]. Search \"Pain\". During daytime hours, please page the attending first. At night please page the resident first.      "

## 2024-05-15 NOTE — PROGRESS NOTES
Data: Patient assessed in the [facility] for uterine contractions. Cervix 4 cm dilated and   effaced. Fetal station -3. Membranes intact. Contractions are 2-5 minutes apart, and last 50-60 seconds. Uterine assessment is mild by palpation during contractions and soft by palpation at rest. See flowsheets for fetal assessment documentation.     Action:  Presumed adequate fetal oxygenation documented. Early labor precautions and discharge instructions reviewed, including when to notify provider if warning signs present. Patient instructed to report decrease in fetal movement, vaginal bleeding, changes in membrane status, abdominal pain, or any concerns related to the pregnancy to patient's provider/clinic.     Response: Orders to discharge home per Angelia العراقي CNM. Plan for patient is to re-evaluate labor status by following up with provider as scheduled. Patient verbalized understanding of education and agreement with plan. Discharged to home at 1115.

## 2024-05-15 NOTE — PROGRESS NOTES
Data: Patient presented to [facility]: 5/15/2024  9:41 AM.  Reason for maternal/fetal assessment is uterine contractions. Patient reports contractions started at 0730 this morning. Patient denies leaking of vaginal fluid/rupture of membranes, vaginal bleeding. Patient reports fetal movement is normal. Patient is a 40w0d . Prenatal record reviewed. Pregnancy has been uncomplicated. Support person is present.     Fetal HR baseline was 120bpm, variability is moderate. Accelerations: present. Decelerations:  absent. Uterine assessment is mild during contractions and soft at rest. Cervix 4 cm dilated and 70% effaced. Fetal station -3. Fetal presentation cephalic per cervical exam. Membranes: intact.    Vital signs wnl. Patient reports mild pain and is coping.     Action: Verbal consent for EFM. Triage assessment completed. Patient may meet criteria for early labor discharge.     Response: Patient verbalized understanding of triage assessment. Will contact Angelia العراقي CNM with assessment and consideration of early labor discharge vs admission.

## 2024-05-16 LAB
BASOPHILS # BLD AUTO: 0.1 10E3/UL (ref 0–0.2)
BASOPHILS NFR BLD AUTO: 0 %
EOSINOPHIL # BLD AUTO: 0 10E3/UL (ref 0–0.7)
EOSINOPHIL NFR BLD AUTO: 0 %
ERYTHROCYTE [DISTWIDTH] IN BLOOD BY AUTOMATED COUNT: 12.6 % (ref 10–15)
HCT VFR BLD AUTO: 29.8 % (ref 35–47)
HGB BLD-MCNC: 9.8 G/DL (ref 11.7–15.7)
IMM GRANULOCYTES # BLD: 0.3 10E3/UL
IMM GRANULOCYTES NFR BLD: 1 %
LYMPHOCYTES # BLD AUTO: 1.1 10E3/UL (ref 0.8–5.3)
LYMPHOCYTES NFR BLD AUTO: 4 %
MCH RBC QN AUTO: 30.4 PG (ref 26.5–33)
MCHC RBC AUTO-ENTMCNC: 32.9 G/DL (ref 31.5–36.5)
MCV RBC AUTO: 93 FL (ref 78–100)
MONOCYTES # BLD AUTO: 1.2 10E3/UL (ref 0–1.3)
MONOCYTES NFR BLD AUTO: 5 %
NEUTROPHILS # BLD AUTO: 23.8 10E3/UL (ref 1.6–8.3)
NEUTROPHILS NFR BLD AUTO: 90 %
NRBC # BLD AUTO: 0 10E3/UL
NRBC BLD AUTO-RTO: 0 /100
PLATELET # BLD AUTO: 148 10E3/UL (ref 150–450)
RBC # BLD AUTO: 3.22 10E6/UL (ref 3.8–5.2)
T PALLIDUM AB SER QL: NONREACTIVE
WBC # BLD AUTO: 26.5 10E3/UL (ref 4–11)

## 2024-05-16 PROCEDURE — 36415 COLL VENOUS BLD VENIPUNCTURE: CPT | Performed by: OBSTETRICS & GYNECOLOGY

## 2024-05-16 PROCEDURE — 120N000001 HC R&B MED SURG/OB

## 2024-05-16 PROCEDURE — 258N000003 HC RX IP 258 OP 636: Performed by: ADVANCED PRACTICE MIDWIFE

## 2024-05-16 PROCEDURE — 250N000011 HC RX IP 250 OP 636: Performed by: OBSTETRICS & GYNECOLOGY

## 2024-05-16 PROCEDURE — 85025 COMPLETE CBC W/AUTO DIFF WBC: CPT | Performed by: OBSTETRICS & GYNECOLOGY

## 2024-05-16 PROCEDURE — 250N000011 HC RX IP 250 OP 636: Performed by: ADVANCED PRACTICE MIDWIFE

## 2024-05-16 PROCEDURE — 250N000013 HC RX MED GY IP 250 OP 250 PS 637: Performed by: ADVANCED PRACTICE MIDWIFE

## 2024-05-16 PROCEDURE — 10907ZC DRAINAGE OF AMNIOTIC FLUID, THERAPEUTIC FROM PRODUCTS OF CONCEPTION, VIA NATURAL OR ARTIFICIAL OPENING: ICD-10-PCS | Performed by: OBSTETRICS & GYNECOLOGY

## 2024-05-16 PROCEDURE — 250N000011 HC RX IP 250 OP 636: Performed by: REGISTERED NURSE

## 2024-05-16 PROCEDURE — 0UQC7ZZ REPAIR CERVIX, VIA NATURAL OR ARTIFICIAL OPENING: ICD-10-PCS | Performed by: OBSTETRICS & GYNECOLOGY

## 2024-05-16 PROCEDURE — 722N000001 HC LABOR CARE VAGINAL DELIVERY SINGLE

## 2024-05-16 PROCEDURE — 258N000003 HC RX IP 258 OP 636: Performed by: OBSTETRICS & GYNECOLOGY

## 2024-05-16 PROCEDURE — 250N000013 HC RX MED GY IP 250 OP 250 PS 637: Performed by: OBSTETRICS & GYNECOLOGY

## 2024-05-16 RX ORDER — SODIUM CHLORIDE, SODIUM LACTATE, POTASSIUM CHLORIDE, CALCIUM CHLORIDE 600; 310; 30; 20 MG/100ML; MG/100ML; MG/100ML; MG/100ML
INJECTION, SOLUTION INTRAVENOUS CONTINUOUS
Status: DISCONTINUED | OUTPATIENT
Start: 2024-05-16 | End: 2024-05-16

## 2024-05-16 RX ORDER — LOPERAMIDE HCL 2 MG
2 CAPSULE ORAL
Status: DISCONTINUED | OUTPATIENT
Start: 2024-05-16 | End: 2024-05-17 | Stop reason: HOSPADM

## 2024-05-16 RX ORDER — MISOPROSTOL 200 UG/1
800 TABLET ORAL
Status: DISCONTINUED | OUTPATIENT
Start: 2024-05-16 | End: 2024-05-17 | Stop reason: HOSPADM

## 2024-05-16 RX ORDER — ACETAMINOPHEN 325 MG/1
650 TABLET ORAL EVERY 4 HOURS PRN
Status: DISCONTINUED | OUTPATIENT
Start: 2024-05-16 | End: 2024-05-17 | Stop reason: HOSPADM

## 2024-05-16 RX ORDER — HYDROCORTISONE 25 MG/G
CREAM TOPICAL 3 TIMES DAILY PRN
Status: DISCONTINUED | OUTPATIENT
Start: 2024-05-16 | End: 2024-05-17 | Stop reason: HOSPADM

## 2024-05-16 RX ORDER — METHYLPREDNISOLONE SODIUM SUCCINATE 125 MG/2ML
125 INJECTION, POWDER, LYOPHILIZED, FOR SOLUTION INTRAMUSCULAR; INTRAVENOUS
Status: DISCONTINUED | OUTPATIENT
Start: 2024-05-16 | End: 2024-05-17 | Stop reason: HOSPADM

## 2024-05-16 RX ORDER — OXYTOCIN/0.9 % SODIUM CHLORIDE 30/500 ML
340 PLASTIC BAG, INJECTION (ML) INTRAVENOUS CONTINUOUS PRN
Status: DISCONTINUED | OUTPATIENT
Start: 2024-05-16 | End: 2024-05-17 | Stop reason: HOSPADM

## 2024-05-16 RX ORDER — MISOPROSTOL 200 UG/1
400 TABLET ORAL
Status: DISCONTINUED | OUTPATIENT
Start: 2024-05-16 | End: 2024-05-17 | Stop reason: HOSPADM

## 2024-05-16 RX ORDER — DIPHENHYDRAMINE HYDROCHLORIDE 50 MG/ML
50 INJECTION INTRAMUSCULAR; INTRAVENOUS
Status: DISCONTINUED | OUTPATIENT
Start: 2024-05-16 | End: 2024-05-17 | Stop reason: HOSPADM

## 2024-05-16 RX ORDER — LOPERAMIDE HCL 2 MG
4 CAPSULE ORAL
Status: DISCONTINUED | OUTPATIENT
Start: 2024-05-16 | End: 2024-05-17 | Stop reason: HOSPADM

## 2024-05-16 RX ORDER — MODIFIED LANOLIN
OINTMENT (GRAM) TOPICAL
Status: DISCONTINUED | OUTPATIENT
Start: 2024-05-16 | End: 2024-05-17 | Stop reason: HOSPADM

## 2024-05-16 RX ORDER — BISACODYL 10 MG
10 SUPPOSITORY, RECTAL RECTAL DAILY PRN
Status: DISCONTINUED | OUTPATIENT
Start: 2024-05-16 | End: 2024-05-17 | Stop reason: HOSPADM

## 2024-05-16 RX ORDER — METHYLERGONOVINE MALEATE 0.2 MG/ML
200 INJECTION INTRAVENOUS
Status: DISCONTINUED | OUTPATIENT
Start: 2024-05-16 | End: 2024-05-17 | Stop reason: HOSPADM

## 2024-05-16 RX ORDER — CARBOPROST TROMETHAMINE 250 UG/ML
250 INJECTION, SOLUTION INTRAMUSCULAR
Status: DISCONTINUED | OUTPATIENT
Start: 2024-05-16 | End: 2024-05-17 | Stop reason: HOSPADM

## 2024-05-16 RX ORDER — TRANEXAMIC ACID 10 MG/ML
1 INJECTION, SOLUTION INTRAVENOUS EVERY 30 MIN PRN
Status: DISCONTINUED | OUTPATIENT
Start: 2024-05-16 | End: 2024-05-17 | Stop reason: HOSPADM

## 2024-05-16 RX ORDER — OXYTOCIN 10 [USP'U]/ML
10 INJECTION, SOLUTION INTRAMUSCULAR; INTRAVENOUS
Status: DISCONTINUED | OUTPATIENT
Start: 2024-05-16 | End: 2024-05-17 | Stop reason: HOSPADM

## 2024-05-16 RX ORDER — DOCUSATE SODIUM 100 MG/1
100 CAPSULE, LIQUID FILLED ORAL DAILY
Status: DISCONTINUED | OUTPATIENT
Start: 2024-05-16 | End: 2024-05-16

## 2024-05-16 RX ORDER — DOCUSATE SODIUM 100 MG/1
100 CAPSULE, LIQUID FILLED ORAL 2 TIMES DAILY PRN
Status: DISCONTINUED | OUTPATIENT
Start: 2024-05-16 | End: 2024-05-17 | Stop reason: HOSPADM

## 2024-05-16 RX ORDER — IBUPROFEN 800 MG/1
800 TABLET, FILM COATED ORAL EVERY 6 HOURS PRN
Status: DISCONTINUED | OUTPATIENT
Start: 2024-05-16 | End: 2024-05-17 | Stop reason: HOSPADM

## 2024-05-16 RX ADMIN — DOCUSATE SODIUM 100 MG: 100 CAPSULE, LIQUID FILLED ORAL at 22:17

## 2024-05-16 RX ADMIN — IBUPROFEN 800 MG: 800 TABLET ORAL at 15:25

## 2024-05-16 RX ADMIN — SODIUM CHLORIDE, POTASSIUM CHLORIDE, SODIUM LACTATE AND CALCIUM CHLORIDE: 600; 310; 30; 20 INJECTION, SOLUTION INTRAVENOUS at 21:50

## 2024-05-16 RX ADMIN — KETOROLAC TROMETHAMINE 30 MG: 30 INJECTION, SOLUTION INTRAMUSCULAR at 01:26

## 2024-05-16 RX ADMIN — ACETAMINOPHEN 650 MG: 325 TABLET ORAL at 04:53

## 2024-05-16 RX ADMIN — BENZOCAINE AND LEVOMENTHOL: 200; 5 SPRAY TOPICAL at 04:56

## 2024-05-16 RX ADMIN — ACETAMINOPHEN 650 MG: 325 TABLET ORAL at 09:49

## 2024-05-16 RX ADMIN — DOCUSATE SODIUM 100 MG: 100 CAPSULE, LIQUID FILLED ORAL at 08:12

## 2024-05-16 RX ADMIN — SODIUM CHLORIDE, POTASSIUM CHLORIDE, SODIUM LACTATE AND CALCIUM CHLORIDE: 600; 310; 30; 20 INJECTION, SOLUTION INTRAVENOUS at 06:47

## 2024-05-16 RX ADMIN — SODIUM CHLORIDE, POTASSIUM CHLORIDE, SODIUM LACTATE AND CALCIUM CHLORIDE: 600; 310; 30; 20 INJECTION, SOLUTION INTRAVENOUS at 15:49

## 2024-05-16 RX ADMIN — ACETAMINOPHEN 650 MG: 325 TABLET ORAL at 21:02

## 2024-05-16 RX ADMIN — IBUPROFEN 800 MG: 800 TABLET ORAL at 21:02

## 2024-05-16 RX ADMIN — AMPICILLIN SODIUM 2 G: 2 INJECTION, POWDER, FOR SOLUTION INTRAMUSCULAR; INTRAVENOUS at 05:55

## 2024-05-16 RX ADMIN — ACETAMINOPHEN 650 MG: 325 TABLET ORAL at 15:25

## 2024-05-16 RX ADMIN — IBUPROFEN 800 MG: 800 TABLET ORAL at 08:12

## 2024-05-16 RX ADMIN — IRON SUCROSE 200 MG: 20 INJECTION, SOLUTION INTRAVENOUS at 14:43

## 2024-05-16 ASSESSMENT — ACTIVITIES OF DAILY LIVING (ADL)
ADLS_ACUITY_SCORE: 19
ADLS_ACUITY_SCORE: 18
ADLS_ACUITY_SCORE: 19
ADLS_ACUITY_SCORE: 18
ADLS_ACUITY_SCORE: 19
ADLS_ACUITY_SCORE: 18

## 2024-05-16 NOTE — PLAN OF CARE
Hgb is now 9.8 was 10.1. pt BP is low, Dr. Rothman and CNM were both updated about this. Pt denies headache, vision changes and dizziness.   Mom is formula feeding and is attentive to baby. Fob is supportive of mom and baby.    Mom will be getting IV iron.    Problem: Adult Inpatient Plan of Care  Goal: Plan of Care Review  Description: The Plan of Care Review/Shift note should be completed every shift.  The Outcome Evaluation is a brief statement about your assessment that the patient is improving, declining, or no change.  This information will be displayed automatically on your shift  note.  Outcome: Progressing     Problem: Adult Inpatient Plan of Care  Goal: Optimal Comfort and Wellbeing  Outcome: Progressing  Intervention: Monitor Pain and Promote Comfort  Recent Flowsheet Documentation  Taken 5/16/2024 1139 by Sasha Faria RN  Pain Management Interventions: medication offered but refused  Intervention: Provide Person-Centered Care  Recent Flowsheet Documentation  Taken 5/16/2024 1144 by Sasha Faria RN  Trust Relationship/Rapport:   care explained   choices provided   emotional support provided   questions answered   questions encouraged   reassurance provided   thoughts/feelings acknowledged  Taken 5/16/2024 0812 by Sasha Faria RN  Trust Relationship/Rapport:   care explained   choices provided   emotional support provided   questions answered   questions encouraged   reassurance provided   thoughts/feelings acknowledged   Goal Outcome Evaluation:

## 2024-05-16 NOTE — L&D DELIVERY NOTE
Vaginal Delivery Note    Name: Jeremy Flores  : 1990  MRN: [id number]    PRE DELIVERY DIAGNOSIS  1) 33 year old  6 Para 4014 at 40w1d      2) Chorioamnionitis  3) Pending grand multip  4) Anemia    POST DELIVERY DIAGNOSIS  1) 33 year old  @ 40w1d  2) Delivery of a viable infant weighing 8#0oz via   3) Chorioamnionitis  4)Grand multip  5) Anemia     YOB: 2024     Birth Time: 12:26 AM       Augmentation Yes              Indication: Inadequate cervical change  Induction No                        Monitors: External     GBS: Negative    ROM: AROM  Fluid Type: Clear    Labor Analgesia/Anesthesia:Epidural    Cord pH obtained: Yes  Placenta submitted to Pathology: No    Description of procedure:   33 year old  with [facility] [place] [facility] and pregnancy complicated by  anemia  presented to L&D with labor contractions.  Her hospital course was complicated by chorioamnionitis.  She was started on Ampicillin and gentamicin prior to delivery. Patient progressed to complete with artificial rupture of membranes and pitocin . The epidural was shut off to facilitate pushing.   Shoulder Dystocia No  Operative Vaginal Delivery No  Infant spontaneously delivered over an intact perineum and a pinpoint lesion on the anterior cervix .   It was repaired in the normal fashion using 3-0 vicryl.  Infant delivered in ROT position.  Nuchal cord No       Placenta spontaneously delivered:   grossly intact with 3 vessel cord.  Infant:  Live, vigorous infant  was handed to  NP due to weak cry and chorio status   Delivery was complicated by nothing Interventions included fundal massage and pitocin.    Delivery QBL (mL): 100    Mother and Infant stable. Will plan to give a second dose of Ampicillin, then monitor maternal fever after for further antibiotic administration. Patient meets criteria for sepsis for fever and maternal tachycardia. CBC ordered, sepsis fluid bolus ordered.     Alyssia Rodríguez  MD    5/16/2024 12:58 AM

## 2024-05-16 NOTE — PROGRESS NOTES
Labor status, fetal heart tracing, and maternal vital signs reviewed with ANDREWS العراقي. Plan frequent position changed and increase pitocin when able.

## 2024-05-16 NOTE — PROGRESS NOTES
Anesthesiologist in room due to patient requesting epidural be turned down. Intermittent bolus changed to 7ml

## 2024-05-16 NOTE — PROGRESS NOTES
"Vaginal Delivery Postpartum Day 0    Patient Name:  Jeremy Flores  :      1990  MRN:      [id number]      Assessment:  Normal postpartum course. Triple I, S/P Amp/gent. Chronic anemia, asymptomatic.    Plan:  Continue current care.IV Venofer today and tomorrow.      Subjective:  The patient feels well:  Voiding without difficulty, lochia normal, tolerating normal diet, ambulating without difficulty and passing flatus. Voiding independently without complication. Pain is well controlled with current medications.  The patient has no emotional concerns.  The baby is well and being fed by bottle.      YOB: 2024   Birth Time: 12:26 AM     Prenatal Complications include:   1)Persistent N/V without diagnosis of hyperemesis  2) GCT- 139  3) Anemia-10.1 on admission    Objective:  BP (!) 84/54 (BP Location: Left arm, Patient Position: Semi-Mayberry's, Cuff Size: Adult Regular)   Pulse 73   Temp 98  F (36.7  C) (Oral)   Resp 16   Ht 1.549 m (5' 1\")   Wt 77.1 kg (170 lb)   LMP 2023 (Exact Date)   SpO2 98%   Breastfeeding Unknown   BMI 32.12 kg/m    Patient Vitals for the past 24 hrs:   BP Temp Temp src Pulse Resp SpO2   24 1139 (!) 84/54 98  F (36.7  C) Oral 73 16 98 %   24 0801 92/58 97.7  F (36.5  C) Oral 71 16 97 %   24 0453 96/56 98.3  F (36.8  C) Oral 76 16 98 %   24 0208 97/53 -- -- -- 16 --   24 0200 108/62 -- -- -- 17 --   24 90/52 99  F (37.2  C) Oral -- 17 --   24 -- -- -- -- -- 98 %   245 -- -- -- -- -- 98 %   243 -- -- -- -- -- 97 %   24 -- -- -- -- -- 98 %   249 -- -- -- -- -- 97 %   24 -- -- -- -- -- 98 %   24 101/59 -- -- -- 17 98 %   24 -- -- -- -- -- 98 %   24 -- -- -- -- -- 97 %   24 -- -- -- -- -- 98 %   24 -- -- -- -- -- 97 %   24 -- -- -- -- -- 97 %   24 -- -- -- -- -- 97 %   24 -- -- -- -- -- " 97 %   05/16/24 0029 -- -- -- -- -- 98 %   05/16/24 0027 -- -- -- -- -- 97 %   05/16/24 0025 -- -- -- -- -- 99 %   05/16/24 0023 -- -- -- -- -- 98 %   05/16/24 0021 -- -- -- -- -- 98 %   05/16/24 0019 -- -- -- -- -- 98 %   05/16/24 0017 -- -- -- -- -- 95 %   05/16/24 0015 -- -- -- -- -- 98 %   05/16/24 0013 -- -- -- -- -- 97 %   05/16/24 0011 -- -- -- -- -- 98 %   05/16/24 0009 -- -- -- -- -- 97 %   05/16/24 0007 -- -- -- -- -- 97 %   05/16/24 0005 -- -- -- -- -- 97 %   05/16/24 0003 -- -- -- -- -- 97 %   05/16/24 0001 -- -- -- -- -- 92 %   05/15/24 2359 -- -- -- -- -- 97 %   05/15/24 2357 -- -- -- -- -- 95 %   05/15/24 2355 -- -- -- -- -- 99 %   05/15/24 2353 -- -- -- -- -- 98 %   05/15/24 2351 -- -- -- -- -- 100 %   05/15/24 2349 -- -- -- -- -- 98 %   05/15/24 2347 -- -- -- -- -- 98 %   05/15/24 2345 -- -- -- -- -- 98 %   05/15/24 2343 -- -- -- -- -- 99 %   05/15/24 2341 -- -- -- -- -- 97 %   05/15/24 2338 -- -- -- -- -- 97 %   05/15/24 2336 -- -- -- -- -- 98 %   05/15/24 2334 -- -- -- -- -- 98 %   05/15/24 2332 -- -- -- -- -- 98 %   05/15/24 2330 -- -- -- -- -- 98 %   05/15/24 2328 -- -- -- -- -- 98 %   05/15/24 2327 112/73 (!) 102  F (38.9  C) -- -- 16 --   05/15/24 2326 -- -- -- -- -- 98 %   05/15/24 2324 -- -- -- -- -- 98 %   05/15/24 2322 -- -- -- -- -- 97 %   05/15/24 2320 -- -- -- -- -- 97 %   05/15/24 2318 -- -- -- -- -- 97 %   05/15/24 2316 -- -- -- -- -- 98 %   05/15/24 2314 -- -- -- -- -- 98 %   05/15/24 2312 -- -- -- -- -- 98 %   05/15/24 2310 -- -- -- -- -- 98 %   05/15/24 2308 -- -- -- -- -- 98 %   05/15/24 2306 -- -- -- -- -- 97 %   05/15/24 2304 -- -- -- -- -- 97 %   05/15/24 2302 -- -- -- -- -- 97 %   05/15/24 2300 -- -- -- -- -- 97 %   05/15/24 2258 -- -- -- -- -- 98 %   05/15/24 2256 -- -- -- -- -- 98 %   05/15/24 2254 -- -- -- -- -- 98 %   05/15/24 2252 -- -- -- -- -- 98 %   05/15/24 2250 -- -- -- -- -- 98 %   05/15/24 2248 -- -- -- -- -- 98 %   05/15/24 6504 -- -- -- -- -- 98 %   05/15/24 8579  -- -- -- -- -- 98 %   05/15/24 2242 -- -- -- -- -- 98 %   05/15/24 2240 -- -- -- -- -- 98 %   05/15/24 2238 -- -- -- -- -- 99 %   05/15/24 2236 -- -- -- -- -- 90 %   05/15/24 2235 106/66 (!) 101.8  F (38.8  C) Oral -- 17 96 %   05/15/24 2233 -- -- -- -- -- 98 %   05/15/24 2231 -- -- -- -- -- 99 %   05/15/24 2229 -- -- -- -- -- (!) 68 %   05/15/24 2227 -- -- -- -- -- 98 %   05/15/24 2225 -- -- -- -- -- 100 %   05/15/24 2223 -- -- -- -- -- 100 %   05/15/24 2221 -- -- -- -- -- 99 %   05/15/24 2219 -- -- -- -- -- 98 %   05/15/24 2217 -- -- -- -- -- 92 %   05/15/24 2215 -- -- -- -- -- 100 %   05/15/24 2214 -- -- -- -- -- 91 %   05/15/24 2213 -- -- -- -- -- 99 %   05/15/24 2211 -- -- -- -- -- 97 %   05/15/24 2209 -- -- -- -- -- 98 %   05/15/24 2207 -- -- -- -- -- 98 %   05/15/24 2205 -- -- -- -- -- 98 %   05/15/24 2203 -- -- -- -- -- 98 %   05/15/24 2201 -- -- -- -- -- 98 %   05/15/24 2159 -- -- -- -- -- 99 %   05/15/24 2157 -- -- -- -- -- 98 %   05/15/24 2155 -- -- -- -- -- 98 %   05/15/24 2153 -- -- -- -- -- 98 %   05/15/24 2151 -- -- -- -- -- 98 %   05/15/24 2149 -- -- -- -- -- 98 %   05/15/24 2147 -- -- -- -- -- 98 %   05/15/24 2145 -- -- -- -- -- 98 %   05/15/24 2143 -- -- -- -- -- 99 %   05/15/24 2141 -- -- -- -- -- 98 %   05/15/24 2139 -- -- -- -- -- 99 %   05/15/24 2137 -- -- -- -- -- 98 %   05/15/24 2135 -- -- -- -- -- 98 %   05/15/24 2133 -- -- -- -- -- 99 %   05/15/24 2132 -- -- -- -- -- (!) 89 %   05/15/24 2131 -- -- -- -- -- 96 %   05/15/24 2129 -- -- -- -- -- 100 %   05/15/24 2127 118/71 100  F (37.8  C) Oral -- 16 100 %   05/15/24 2125 -- -- -- -- -- 100 %   05/15/24 2123 -- -- -- -- -- 100 %   05/15/24 2121 -- -- -- -- -- 100 %   05/15/24 2119 -- -- -- -- -- 100 %   05/15/24 2117 -- -- -- -- -- 100 %   05/15/24 2115 -- -- -- -- -- 100 %   05/15/24 2113 -- -- -- -- -- 97 %   05/15/24 2111 -- -- -- -- -- 100 %   05/15/24 2109 -- -- -- -- -- 100 %   05/15/24 2107 -- -- -- -- -- 98 %   05/15/24 2105 -- -- --  -- -- 97 %   05/15/24 2103 -- -- -- -- -- 99 %   05/15/24 2101 -- -- -- -- -- 100 %   05/15/24 2059 -- -- -- -- -- 99 %   05/15/24 2057 -- -- -- -- -- 98 %   05/15/24 2055 -- -- -- -- -- 99 %   05/15/24 2053 -- -- -- -- -- 99 %   05/15/24 2051 -- -- -- -- -- 99 %   05/15/24 2049 -- -- -- -- -- 97 %   05/15/24 2047 -- -- -- -- -- 99 %   05/15/24 2045 -- -- -- -- -- 99 %   05/15/24 2043 -- -- -- -- -- 99 %   05/15/24 2041 -- -- -- -- -- 99 %   05/15/24 2039 -- -- -- -- -- 99 %   05/15/24 2037 -- -- -- -- -- 99 %   05/15/24 2035 -- -- -- -- -- 99 %   05/15/24 2033 -- -- -- -- -- 99 %   05/15/24 2031 -- -- -- -- -- 98 %   05/15/24 2029 -- -- -- -- -- 99 %   05/15/24 2027 91/52 100.1  F (37.8  C) Oral -- 17 99 %   05/15/24 2025 -- -- -- -- -- 99 %   05/15/24 2023 -- -- -- -- -- 100 %   05/15/24 2021 -- -- -- -- -- 99 %   05/15/24 2019 -- -- -- -- -- 100 %   05/15/24 2017 -- -- -- -- -- 99 %   05/15/24 2015 -- -- -- -- -- 99 %   05/15/24 2013 -- -- -- -- -- 98 %   05/15/24 2011 -- -- -- -- -- 96 %   05/15/24 2009 -- -- -- -- -- 99 %   05/15/24 2007 -- -- -- -- -- 99 %   05/15/24 2005 -- -- -- -- -- 99 %   05/15/24 2003 -- -- -- -- -- 98 %   05/15/24 2001 -- -- -- -- -- 100 %   05/15/24 1959 -- -- -- -- -- 98 %   05/15/24 1957 -- -- -- -- -- 100 %   05/15/24 1955 -- -- -- -- -- 99 %   05/15/24 1953 -- -- -- -- -- 99 %   05/15/24 1951 -- -- -- -- -- 98 %   05/15/24 1949 -- -- -- -- -- 98 %   05/15/24 1947 -- -- -- -- -- 99 %   05/15/24 1945 -- -- -- -- -- 98 %   05/15/24 1943 -- -- -- -- -- 99 %   05/15/24 1941 -- -- -- -- -- 98 %   05/15/24 1939 -- -- -- -- -- 98 %   05/15/24 1937 -- -- -- -- -- 98 %   05/15/24 1935 -- -- -- -- -- 99 %   05/15/24 1933 -- -- -- -- -- 100 %   05/15/24 1931 -- -- -- -- -- 98 %   05/15/24 1929 -- -- -- -- -- 98 %   05/15/24 1927 -- -- -- -- -- 99 %   05/15/24 1926 99/59 (!) 100.5  F (38.1  C) Oral -- 16 99 %   05/15/24 1925 -- -- -- -- -- 99 %   05/15/24 1923 -- -- -- -- -- 99 %   05/15/24  1921 -- -- -- -- -- 97 %   05/15/24 1919 -- -- -- -- -- 100 %   05/15/24 1917 -- -- -- -- -- 97 %   05/15/24 1915 -- -- -- -- -- 98 %   05/15/24 1913 -- -- -- -- -- 99 %   05/15/24 1911 -- -- -- -- -- 98 %   05/15/24 1909 -- -- -- -- -- 98 %   05/15/24 1907 -- -- -- -- -- 99 %   05/15/24 1905 -- -- -- -- -- 99 %   05/15/24 1903 -- -- -- -- -- 98 %   05/15/24 1901 -- -- -- -- -- 97 %   05/15/24 1900 (!) 88/51 -- -- -- 16 --   05/15/24 1859 -- -- -- -- -- 99 %   05/15/24 1857 -- -- -- -- -- 99 %   05/15/24 1855 -- -- -- -- -- 98 %   05/15/24 1853 -- -- -- -- -- 98 %   05/15/24 1851 -- -- -- -- -- 98 %   05/15/24 1850 -- 100.3  F (37.9  C) Oral -- 18 --   05/15/24 1849 -- -- -- -- -- 99 %   05/15/24 1847 -- -- -- -- -- 99 %   05/15/24 1845 -- -- -- -- -- 99 %   05/15/24 1843 -- -- -- -- -- 97 %   05/15/24 1841 -- -- -- -- -- 97 %   05/15/24 1839 -- -- -- -- -- 99 %   05/15/24 1837 -- -- -- -- -- 99 %   05/15/24 1835 -- -- -- -- -- 98 %   05/15/24 1833 -- -- -- -- -- 98 %   05/15/24 1831 -- -- -- -- -- 98 %   05/15/24 1830 -- -- -- -- 16 --   05/15/24 1829 -- -- -- -- -- 98 %   05/15/24 1827 -- -- -- -- -- 98 %   05/15/24 1825 -- -- -- -- -- 98 %   05/15/24 1823 -- -- -- -- -- 99 %   05/15/24 1821 -- -- -- -- -- 98 %   05/15/24 1819 -- -- -- -- -- 98 %   05/15/24 1817 -- -- -- -- -- 99 %   05/15/24 1815 -- -- -- -- -- 98 %   05/15/24 1813 -- -- -- -- -- 98 %   05/15/24 1811 -- -- -- -- -- 98 %   05/15/24 1809 -- -- -- -- -- 99 %   05/15/24 1807 98/60 -- -- -- -- 99 %   05/15/24 1805 -- -- -- -- -- 98 %   05/15/24 1803 -- -- -- -- -- 98 %   05/15/24 1801 -- -- -- -- -- 98 %   05/15/24 1800 -- 99  F (37.2  C) Oral -- 18 --   05/15/24 1759 -- -- -- -- -- 99 %   05/15/24 1757 -- -- -- -- -- 98 %   05/15/24 1755 -- -- -- -- -- 98 %   05/15/24 1753 -- -- -- -- -- 98 %   05/15/24 1751 -- -- -- -- -- 98 %   05/15/24 1749 -- -- -- -- -- 98 %   05/15/24 1747 -- -- -- -- -- 98 %   05/15/24 1745 -- -- -- -- -- 98 %   05/15/24 174  -- -- -- -- -- 98 %   05/15/24 1741 -- -- -- -- -- 98 %   05/15/24 1739 -- -- -- -- -- 98 %   05/15/24 1737 -- -- -- -- -- 98 %   05/15/24 1735 -- -- -- -- -- 98 %   05/15/24 1733 -- -- -- -- -- 98 %   05/15/24 1731 -- -- -- -- 16 99 %   05/15/24 1729 -- -- -- -- -- 97 %   05/15/24 1727 -- -- -- -- -- 99 %   05/15/24 1725 -- -- -- -- -- 98 %   05/15/24 1723 -- -- -- -- -- 98 %   05/15/24 1721 -- -- -- -- -- 98 %   05/15/24 1719 -- -- -- -- -- 98 %   05/15/24 1717 -- -- -- -- -- 98 %   05/15/24 1715 -- -- -- -- -- 98 %   05/15/24 1713 -- -- -- -- -- 99 %   05/15/24 1711 -- -- -- -- -- 97 %   05/15/24 1709 -- -- -- -- -- 98 %   05/15/24 1707 -- -- -- -- -- 98 %   05/15/24 1705 -- -- -- -- -- 98 %   05/15/24 1703 -- -- -- -- -- 98 %   05/15/24 1700 92/57 98.2  F (36.8  C) Oral -- 16 99 %   05/15/24 1659 -- -- -- -- -- 99 %   05/15/24 1657 -- -- -- -- -- 99 %   05/15/24 1655 -- -- -- -- -- 97 %   05/15/24 1653 -- -- -- -- -- 98 %   05/15/24 1651 -- -- -- -- -- 96 %   05/15/24 1649 -- -- -- -- -- 98 %   05/15/24 1647 -- -- -- -- -- 99 %   05/15/24 1645 -- -- -- -- -- 98 %   05/15/24 1643 -- -- -- -- -- 98 %   05/15/24 1641 -- -- -- -- -- 98 %   05/15/24 1639 -- -- -- -- -- 98 %   05/15/24 1637 -- -- -- -- -- 99 %   05/15/24 1636 92/54 -- -- -- -- --   05/15/24 1635 -- -- -- -- -- 98 %   05/15/24 1633 -- -- -- -- -- 98 %   05/15/24 1631 -- -- -- -- -- 97 %   05/15/24 1630 -- -- -- -- 16 --   05/15/24 1629 -- -- -- -- -- 99 %   05/15/24 1627 -- -- -- -- -- 98 %   05/15/24 1625 -- -- -- -- -- 97 %   05/15/24 1623 -- -- -- -- -- 99 %   05/15/24 1621 -- -- -- -- -- 98 %   05/15/24 1619 -- -- -- -- -- 97 %   05/15/24 1617 -- -- -- -- -- 97 %   05/15/24 1615 -- -- -- -- -- 100 %   05/15/24 1613 -- -- -- -- -- 98 %   05/15/24 1611 -- -- -- -- -- 97 %   05/15/24 1609 -- -- -- -- -- 97 %   05/15/24 1607 -- -- -- -- -- 98 %   05/15/24 1605 91/51 -- -- -- -- 98 %   05/15/24 1603 -- -- -- -- -- 98 %   05/15/24 1601 -- -- -- -- -- 98  %   05/15/24 1600 -- 98  F (36.7  C) Oral -- 16 --   05/15/24 1559 -- -- -- -- -- 97 %   05/15/24 1557 -- -- -- -- -- 100 %   05/15/24 1555 -- -- -- -- -- 98 %   05/15/24 1553 -- -- -- -- -- 99 %   05/15/24 1551 -- -- -- -- -- 99 %   05/15/24 1549 -- -- -- -- -- 99 %   05/15/24 1547 -- -- -- -- -- 98 %   05/15/24 1545 -- -- -- -- -- 98 %   05/15/24 1543 -- -- -- -- -- 99 %   05/15/24 1541 -- -- -- -- -- 99 %   05/15/24 1539 -- -- -- -- -- 100 %   05/15/24 1537 -- -- -- -- -- 100 %   05/15/24 1535 -- -- -- -- -- 98 %   05/15/24 1534 98/64 -- -- -- -- --   05/15/24 1533 -- -- -- -- -- 98 %   05/15/24 1531 -- -- -- -- -- 96 %   05/15/24 1530 -- -- -- -- 16 --   05/15/24 1529 99/59 -- -- -- -- 98 %   05/15/24 1527 -- -- -- -- -- 99 %   05/15/24 1525 -- -- -- -- -- 100 %   05/15/24 1524 97/61 -- -- -- -- --   05/15/24 1523 -- -- -- -- -- 100 %   05/15/24 1521 -- -- -- -- -- 99 %   05/15/24 1519 95/60 -- -- -- -- 99 %   05/15/24 1517 102/62 -- -- -- -- 99 %   05/15/24 1515 -- -- -- -- -- 99 %   05/15/24 1513 -- -- -- -- -- 100 %   05/15/24 1511 -- -- -- -- -- 100 %   05/15/24 1509 96/59 -- -- -- -- 99 %   05/15/24 1507 -- -- -- -- -- 100 %   05/15/24 1505 -- -- -- -- -- 99 %   05/15/24 1503 106/61 -- -- -- -- 99 %   05/15/24 1500 96/60 -- -- -- 16 100 %   05/15/24 1459 94/59 -- -- -- -- 100 %   05/15/24 1457 (!) 89/57 -- -- -- -- 100 %   05/15/24 1455 95/63 -- -- -- -- 100 %   05/15/24 1453 93/59 -- -- -- -- 100 %   05/15/24 1451 101/68 98.9  F (37.2  C) Oral -- 16 99 %   05/15/24 1449 95/67 -- -- -- -- --   05/15/24 1447 96/65 -- -- -- -- 99 %   05/15/24 1445 94/64 -- -- -- 16 99 %   05/15/24 1444 -- -- -- -- 16 --   05/15/24 1443 -- -- -- -- -- 99 %   05/15/24 1442 (!) 89/54 -- -- -- -- --   05/15/24 1441 -- -- -- -- -- 99 %   05/15/24 1438 96/56 -- -- -- -- 99 %   05/15/24 1437 106/61 -- -- -- -- 100 %   05/15/24 1435 95/53 -- -- -- -- 100 %   05/15/24 1433 (!) 89/63 -- -- -- -- 100 %   05/15/24 1431 100/66 -- -- -- 18  100 %   05/15/24 1430 -- -- -- -- 18 --   05/15/24 1429 113/68 -- -- -- -- 100 %   05/15/24 1427 -- -- -- -- -- 100 %   05/15/24 1425 -- -- -- -- -- 99 %   05/15/24 1423 -- -- -- -- -- 98 %   05/15/24 1421 -- -- -- -- -- 99 %   05/15/24 1419 -- -- -- -- -- 99 %   05/15/24 1417 -- -- -- -- -- 99 %   05/15/24 1415 -- -- -- -- 16 99 %   05/15/24 1413 -- -- -- -- -- 99 %   05/15/24 1411 -- -- -- -- -- 99 %   05/15/24 1409 -- -- -- -- -- 99 %   05/15/24 1407 -- -- -- -- -- 99 %   05/15/24 1405 -- -- -- -- -- 99 %   05/15/24 1403 -- -- -- -- -- 100 %   05/15/24 1401 -- -- -- -- -- 99 %   05/15/24 1400 -- -- -- -- 16 --   05/15/24 1359 -- -- -- -- -- 98 %   05/15/24 1357 -- -- -- -- -- 98 %   05/15/24 1355 -- -- -- -- -- 99 %   05/15/24 1353 -- -- -- -- -- 99 %   05/15/24 1351 -- -- -- -- -- 98 %   05/15/24 1349 -- -- -- -- -- 98 %       Exam: Patient A&O x 3. No acute distress, breathing unlabored.The amount and color of the lochia is appropriate for the duration of recovery.   Lab Results   Component Value Date    AS Negative 05/15/2024    HEPBANG Negative 02/27/2018    GCPCRT Negative 02/27/2018    HGB 9.8 (L) 05/16/2024       Immunization History   Administered Date(s) Administered    COVID-19 MONOVALENT 12+ ([employer]) 04/29/2021, 05/20/2021    Flu, Unspecified 11/14/2011    HIB, Unspecified 04/25/1991, 06/27/1991, 08/29/1991, 03/09/1992    HPV Quadrivalent 02/07/2012, 07/25/2013, 02/26/2015    HepA, Unspecified 02/07/2012    HepB, Unspecified 02/07/2012, 07/25/2013    Hepatitis A (ADULT 19+) 02/07/2012, 07/25/2013    Hepatitis B, Adult 02/07/2012, 07/25/2013, 02/26/2015    Hepatitis B, Peds 07/08/1992, 08/05/1992, 01/27/1993    Historical DTP/aP 01/31/1991, 04/25/1991, 06/27/1991, 07/08/1992, 05/15/1995    Influenza (IIV3) PF 11/19/2003, 10/22/2010, 11/14/2011, 02/26/2015    Influenza Vaccine >6 months,quad, PF 01/19/2018    Influenza Vaccine, 6+MO IM (QUADRIVALENT W/PRESERVATIVES) 02/26/2015    MMR 03/09/1992,  07/02/2003    Meningococcal ACWY (Menactra ) 08/18/2009, 02/07/2012    OPV, trivalent, live 01/31/1991, 04/25/1991, 06/27/1991, 05/15/1995    TDAP (Adacel,Boostrix) 02/07/2012, 04/12/2013, 12/03/2019    Td (Adult), Adsorbed 07/02/2003    Typhoid Oral 04/17/2015       Provider:  TATE Pop CNM    Date:  5/16/2024  Time:  1:47 PM

## 2024-05-16 NOTE — PLAN OF CARE
Goal Outcome Evaluation:        Problem: Adult Inpatient Plan of Care  Goal: Optimal Comfort and Wellbeing  Outcome: Progressing  Intervention: Provide Person-Centered Care    Problem: Postpartum (Vaginal Delivery)  Goal: Successful Parent Role Transition  Outcome: Progressing  Intervention: Support Parent Role Transition     Pt. VS stable. Pt. Ambulating with assist of one. Pt is bottle feeding  with formula per parental request. Lochia is light. No clots noted. Pt. C/o pain to perineum with movement. Pt. Received PRN tylenol with effectiveness. Bowel sounds audible and active x4. Tolerating PO intake. Denied N/V. Fundus is firm but with first check fundus was deviated to the right. Pt. Encouraged to use the bathroom and after voiding fundus was midline, firm, and at the umbilicus. Pt. Received IV antibiotics as ordered and has LR infusing continuously per MD order.  Pt. Attentive to  and cares and bonding well. Significant Other at bedside and supportive.

## 2024-05-16 NOTE — ANESTHESIA POSTPROCEDURE EVALUATION
Patient: Jeremy Flores    Procedure: * No procedures listed *       Anesthesia Type:  Epidural    Note:  Disposition: Outpatient   Postop Pain Control: Uneventful            Sign Out: Well controlled pain   PONV: No   Neuro/Psych: Uneventful            Sign Out: Acceptable/Baseline neuro status   Airway/Respiratory: Uneventful            Sign Out: Acceptable/Baseline resp. status   CV/Hemodynamics: Uneventful            Sign Out: Acceptable CV status; No obvious hypovolemia; No obvious fluid overload   Other NRE: NONE   DID A NON-ROUTINE EVENT OCCUR? No    Event details/Postop Comments:  Patient recovering comfortably.        Last vitals:  Vitals:    05/16/24 0453 05/16/24 0801 05/16/24 1139   BP: 96/56 92/58 (!) 84/54   Pulse: 76 71 73   Resp: 16 16 16   Temp: 36.8  C (98.3  F) 36.5  C (97.7  F) 36.7  C (98  F)   SpO2: 98% 97% 98%       Electronically Signed By: Clark Marino DO  May 16, 2024  2:32 PM

## 2024-05-16 NOTE — PROGRESS NOTES
"LABOR PROGRESS NOTE  Patient Name:  Jeremy Flores  :      1990  MRN:      [id number]    SUBJECTIVE:  IUP at 40w0d . Pt has been doing position changes to help asynclitic presentation of baby. Is feeling well with epidural, does note increased vaginal pressure with position changes.     OBJECTIVE:  BP 91/52   Temp (!) 100.5  F (38.1  C) (Oral)   Resp 16   Ht 1.549 m (5' 1\")   Wt 77.1 kg (170 lb)   LMP 2023 (Exact Date)   SpO2 99%   BMI 32.12 kg/m    Membranes:  SVE: /- 1. Asynclitic presentation is now resolved, is now TIERRA  FHTs: 160 bpm, mod variability, + accels, no decels, Cx q2 min, Cat 1      ASSESSMENT:  Labor progressing    Maternal fever     PLAN:   -Continue PPP  -Anticipate   -Plan to recheck patient in 1 hour or with increasing pressure  - Maternal fever - baby not tachycardic, no indication to treat at this time. Continue to monitor closely    Alyssia Piercel, DO  [facility]     "

## 2024-05-16 NOTE — PROGRESS NOTES
"LABOR PROGRESS NOTE  Patient Name:  Jeremy Flores  :      1990  MRN:      [id number]    SUBJECTIVE:  IUP at 40w0d . Patient was complete and attempted to push, however was unable to feel her contractions or where to push. Had epidural infusion rate turned down but still was unable to feel contractions.     OBJECTIVE:  /66   Temp (!) 101.8  F (38.8  C) (Oral)   Resp 17   Ht 1.549 m (5' 1\")   Wt 77.1 kg (170 lb)   LMP 2023 (Exact Date)   SpO2 96%   BMI 32.12 kg/m    Membranes:  SVE: 10/100/-1  FHTs: 170-180 bpm, min variability, + accels, no decels, Cx qq2 min min, Cat 2      ASSESSMENT:  Labor progressing    Chorioamnionitis - diagnosed by maternal fever and fetal tachycardia     PLAN:   -continue pitocin  -Will shut off epidural at patient's request  -Will place patient in high thrones while epidural wears off and start pushing again in ~20 min  -Will start ampicillin and gentamicin for chorioamnionitis.     DO [facility]     "

## 2024-05-16 NOTE — PROGRESS NOTES
Provider notification:   Provider: Alyssia Rodríguez MD was notified at 2105 regarding: a persistent Category II fetal heart rate tracing for 50 minutes.    Category II Algorithm     Fetal heart rate and uterine activity reviewed with provider.    EFM interpretation suggests: concern for persistent Category II tracing due to: minimal variability.  EFM suggests no concern for interruption of the oxygen pathway..     Interventions to improve fetal oxygenation for a Category II tracing include: continue monitoring and maternal positioning    After discussion with provider: Continue to monitor fetal heart tracing and maternal temperature. Plan to re-check cervix in approximately 1 hour.

## 2024-05-16 NOTE — PLAN OF CARE
Goal Outcome Evaluation:       NVD at 0026, delivered by Dr. Rodríguez. FHT category 2 prior to delivery. Patient bonding well with infant. Fundus firm and bleeding WNL. Patient had 2 spontaneous voids greater than 200 ml and is able to ambulate independently.

## 2024-05-17 VITALS
SYSTOLIC BLOOD PRESSURE: 92 MMHG | HEART RATE: 76 BPM | DIASTOLIC BLOOD PRESSURE: 57 MMHG | TEMPERATURE: 98.6 F | RESPIRATION RATE: 16 BRPM | WEIGHT: 170 LBS | HEIGHT: 61 IN | BODY MASS INDEX: 32.1 KG/M2 | OXYGEN SATURATION: 99 %

## 2024-05-17 LAB
ERYTHROCYTE [DISTWIDTH] IN BLOOD BY AUTOMATED COUNT: 12.8 % (ref 10–15)
HCT VFR BLD AUTO: 23.7 % (ref 35–47)
HGB BLD-MCNC: 7.6 G/DL (ref 11.7–15.7)
MCH RBC QN AUTO: 29.7 PG (ref 26.5–33)
MCHC RBC AUTO-ENTMCNC: 32.1 G/DL (ref 31.5–36.5)
MCV RBC AUTO: 93 FL (ref 78–100)
PLATELET # BLD AUTO: 138 10E3/UL (ref 150–450)
RBC # BLD AUTO: 2.56 10E6/UL (ref 3.8–5.2)
WBC # BLD AUTO: 15.3 10E3/UL (ref 4–11)

## 2024-05-17 PROCEDURE — 250N000013 HC RX MED GY IP 250 OP 250 PS 637: Performed by: OBSTETRICS & GYNECOLOGY

## 2024-05-17 PROCEDURE — 258N000003 HC RX IP 258 OP 636: Performed by: ADVANCED PRACTICE MIDWIFE

## 2024-05-17 PROCEDURE — 36415 COLL VENOUS BLD VENIPUNCTURE: CPT | Performed by: ADVANCED PRACTICE MIDWIFE

## 2024-05-17 PROCEDURE — 250N000011 HC RX IP 250 OP 636: Performed by: ADVANCED PRACTICE MIDWIFE

## 2024-05-17 PROCEDURE — 85014 HEMATOCRIT: CPT | Performed by: ADVANCED PRACTICE MIDWIFE

## 2024-05-17 PROCEDURE — 250N000013 HC RX MED GY IP 250 OP 250 PS 637: Performed by: ADVANCED PRACTICE MIDWIFE

## 2024-05-17 RX ORDER — IBUPROFEN 800 MG/1
800 TABLET, FILM COATED ORAL EVERY 8 HOURS PRN
COMMUNITY
Start: 2024-05-17

## 2024-05-17 RX ADMIN — IBUPROFEN 800 MG: 800 TABLET ORAL at 12:36

## 2024-05-17 RX ADMIN — WITCH HAZEL: 500 SOLUTION RECTAL; TOPICAL at 01:03

## 2024-05-17 RX ADMIN — ACETAMINOPHEN 650 MG: 325 TABLET ORAL at 01:00

## 2024-05-17 RX ADMIN — ACETAMINOPHEN 650 MG: 325 TABLET ORAL at 17:45

## 2024-05-17 RX ADMIN — ACETAMINOPHEN 650 MG: 325 TABLET ORAL at 06:23

## 2024-05-17 RX ADMIN — DOCUSATE SODIUM 100 MG: 100 CAPSULE, LIQUID FILLED ORAL at 08:31

## 2024-05-17 RX ADMIN — ACETAMINOPHEN 650 MG: 325 TABLET ORAL at 12:36

## 2024-05-17 RX ADMIN — IRON SUCROSE 200 MG: 20 INJECTION, SOLUTION INTRAVENOUS at 15:31

## 2024-05-17 RX ADMIN — IBUPROFEN 800 MG: 800 TABLET ORAL at 03:50

## 2024-05-17 RX ADMIN — IBUPROFEN 800 MG: 800 TABLET ORAL at 17:45

## 2024-05-17 ASSESSMENT — ACTIVITIES OF DAILY LIVING (ADL)
ADLS_ACUITY_SCORE: 18

## 2024-05-17 NOTE — DISCHARGE INSTRUCTIONS
*You have a Home Care nurse visit planned for Saturday. The nurse will contact you after discharge to confirm the appointment time. If you do not hear from the nurse by Saturday morning, please call [phone].   (Please do not schedule a clinic appointment on the same day as home nurse visit.)      Warning Signs after Having a Baby    Keep this paper on your fridge or somewhere else where you can see it.    Call your provider if you have any of these symptoms up to 12 weeks after having your baby.    Thoughts of hurting yourself or your baby  Pain in your chest or trouble breathing  Severe headache not helped by pain medicine  Eyesight concerns (blurry vision, seeing spots or flashes of light, other changes to eyesight)  Fainting, shaking or other signs of a seizure    Call [id number] if you feel that it is an emergency.     The symptoms below can happen to anyone after giving birth. They can be very serious. Call your provider if you have any of these warning signs.    My provider s phone number: _______________________    Losing too much blood (hemorrhage)    Call your provider if you soak through a pad in less than an hour or pass blood clots bigger than a golf ball. These may be signs that you are bleeding too much.    Blood clots in the legs or lungs    After you give birth, your body naturally clots its blood to help prevent blood loss. Sometimes this increased clotting can happen in other areas of the body, like the legs or lungs. This can block your blood flow and be very dangerous.     Call your provider if you:  Have a red, swollen spot on the back of your leg that is warm or painful when you touch it.   Are coughing up blood.     Infection    Call your provider if you have any of these symptoms:  Fever of 100.4 F (38 C) or higher.  Pain or redness around your stitches if you had an incision.   Any yellow, white, or green fluid coming from places where you had stitches or surgery.    Mood Problems  (postpartum depression)    Many people feel sad or have mood changes after having a baby. But for some people, these mood swings are worse.     Call your provider right away if you feel so anxious or nervous that you can't care for yourself or your baby.    Preeclampsia (high blood pressure)    Even if you didn't have high blood pressure when you were pregnant, you are at risk for the high blood pressure disease called preeclampsia. This risk can last up to 12 weeks after giving birth.     Call your provider if you have:   Pain on your right side under your rib cage  Sudden swelling in the hands and face    Remember: You know your body. If something doesn't feel right, get medical help.     For informational purposes only. Not to replace the advice of your health care provider. Copyright 2020 [facility]. All rights reserved. Clinically reviewed by Kristy Kruger RNC-OB, MSN. [employer] 783357 - Rev 02/23.

## 2024-05-17 NOTE — PROGRESS NOTES
"Vaginal Delivery Postpartum Day 1    Patient Name:  Jeremy Flores  :      1990  MRN:      [id number]      Assessment:  Day 1 s/p  complicated by Triple I s/p amp/gent  Asymptomatic acute blood loss anemia evidenced by 10.1 hemoglobin on admit --> 279cc blood loss --> 9.8 hemoglobin () --> 7.6 hemoglobin ()    Plan:  Continue current care. We reviewed R/B/A of blood transfusion given significant hemoglobin drop. Jeremy reports she is asymptomatic and declines this. She would prefer to continue with oral iron at home.      Subjective:  The patient feels well:  Voiding without difficulty, lochia normal, tolerating normal diet, ambulating without difficulty and passing flatus. She denies headache, vision changes, URQ/epigastric pain, dizziness, lightheadedness, shortness of breath, and tachycardia. Voiding independently without complication. Pain is well controlled with current medications.  The patient has no emotional concerns.  The baby is well and being fed by both breast and bottle.      YOB: 2024   Birth Time: 12:26 AM     Prenatal Complications include:   1) Persistent N/V without diagnosis of hyperemesis  2) GCT- 139  3) Anemia-10.1 on admission    Objective:  BP 92/65 (BP Location: Right arm)   Pulse 70   Temp 98.5  F (36.9  C) (Oral)   Resp 18   Ht 1.549 m (5' 1\")   Wt 77.1 kg (170 lb)   LMP 2023 (Exact Date)   SpO2 97%   Breastfeeding Unknown   BMI 32.12 kg/m    Patient Vitals for the past 24 hrs:   BP Temp Temp src Pulse Resp SpO2   24 0737 92/65 98.5  F (36.9  C) Oral 70 18 97 %   24 0352 96/59 97.9  F (36.6  C) Oral -- 16 98 %   24 0030 105/70 98  F (36.7  C) Oral 66 16 98 %   24 2102 103/56 98.2  F (36.8  C) Oral 70 16 97 %   24 1545 (!) 87/58 97.5  F (36.4  C) Oral 76 16 99 %   24 1516 91/61 97.5  F (36.4  C) Oral 68 16 99 %   24 1446 91/59 97.6  F (36.4  C) Oral 77 16 99 %   24 1139 (!) 84/54 98  F (36.7  C) Oral 73 16 " 98 %       Exam: Patient A&O x 3. No acute distress, breathing unlabored.The amount and color of the lochia is appropriate for the duration of recovery. Nursing note reviewed.    Lab Results   Component Value Date    AS Negative 05/15/2024    HEPBANG Negative 02/27/2018    GCPCRT Negative 02/27/2018    HGB 7.6 (L) 05/17/2024       Immunization History   Administered Date(s) Administered    COVID-19 MONOVALENT 12+ (Pfizer) 04/29/2021, 05/20/2021    Flu, Unspecified 11/14/2011    HIB, Unspecified 04/25/1991, 06/27/1991, 08/29/1991, 03/09/1992    HPV Quadrivalent 02/07/2012, 07/25/2013, 02/26/2015    HepA, Unspecified 02/07/2012    HepB, Unspecified 02/07/2012, 07/25/2013    Hepatitis A (ADULT 19+) 02/07/2012, 07/25/2013    Hepatitis B, Adult 02/07/2012, 07/25/2013, 02/26/2015    Hepatitis B, Peds 07/08/1992, 08/05/1992, 01/27/1993    Historical DTP/aP 01/31/1991, 04/25/1991, 06/27/1991, 07/08/1992, 05/15/1995    Influenza (IIV3) PF 11/19/2003, 10/22/2010, 11/14/2011, 02/26/2015    Influenza Vaccine >6 months,quad, PF 01/19/2018    Influenza Vaccine, 6+MO IM (QUADRIVALENT W/PRESERVATIVES) 02/26/2015    MMR 03/09/1992, 07/02/2003    Meningococcal ACWY (Menactra ) 08/18/2009, 02/07/2012    OPV, trivalent, live 01/31/1991, 04/25/1991, 06/27/1991, 05/15/1995    TDAP (Adacel,Boostrix) 02/07/2012, 04/12/2013, 12/03/2019    Td (Adult), Adsorbed 07/02/2003    Typhoid Oral 04/17/2015       Provider:  TATE Miller CNM    Date:  5/17/2024  Time:  11:28 AM

## 2024-05-17 NOTE — PLAN OF CARE
Pain is controlled with PO Tylenol and Ibuprofen. Pt has tucks, ice and dermoplast. She is formula feeding. Plan to discharge at 6 PM tonight if everything looks good.  Will get another dose of iron sucrose.    Home care visit planned for Saturday and follow up with PCP on Monday.      Problem: Adult Inpatient Plan of Care  Goal: Absence of Hospital-Acquired Illness or Injury  Intervention: Prevent Skin Injury  Recent Flowsheet Documentation  Taken 5/17/2024 1232 by Sasha Faria RN  Body Position: position changed independently  Taken 5/17/2024 0802 by Sasha Faria RN  Body Position: position changed independently  Taken 5/17/2024 0737 by Sasha Faria RN  Body Position: position changed independently  Intervention: Prevent Infection  Recent Flowsheet Documentation  Taken 5/17/2024 1232 by Sasha Faria RN  Infection Prevention: hand hygiene promoted  Taken 5/17/2024 0802 by Sasha Faria RN  Infection Prevention: hand hygiene promoted     Problem: Postpartum (Vaginal Delivery)  Goal: Optimal Pain Control and Function  Intervention: Prevent or Manage Pain  Recent Flowsheet Documentation  Taken 5/17/2024 1232 by Sasha Faria RN  Perineal Care:   perineal hygiene encouraged   perineal spray bottle/warm water use encouraged   perineum cleansed  Taken 5/17/2024 0802 by Sasha Faria RN  Perineal Care:   perineal hygiene encouraged   perineal spray bottle/warm water use encouraged   perineum cleansed  Taken 5/17/2024 0737 by Sasha Faria RN  Pain Management Interventions: rest  Perineal Care:   perineal hygiene encouraged   perineal spray bottle/warm water use encouraged   perineum cleansed   Goal Outcome Evaluation:

## 2024-05-17 NOTE — PROGRESS NOTES
"[facility] RN Care Coordinator Note    Jeremy Flores  [id number]  1990    Chart reviewed, discharge plan discussed with care team, needs assessed. Patient requests home care visit, nurse visit planned for Saturday. [facility] Intake contacted, updated by this writer; patient [facility] to [facility] schedule. Follow-up post-delivery appointment planned in 6 weeks at OB clinic.    Patient is reported to have support at home and is ready to discharge today with , \"Rachid\". RN Care Coordinator will continue to follow and assist if needed with discharge plan. Helen Dexter RN on 2024 at 12:15 PM    "

## 2024-05-17 NOTE — PLAN OF CARE
Goal Outcome Evaluation:      Problem: Adult Inpatient Plan of Care  Goal: Optimal Comfort and Wellbeing  Outcome: Progressing  Intervention: Provide Person-Centered Care      Problem: Postpartum (Vaginal Delivery)  Goal: Optimal Pain Control and Function  Outcome: Progressing     Pt. VS stable. Pt. Up ad nicky. Pt is bottle feeding with formula per parental request. Lochia is scant. No clots noted. Pt. C/o uterine cramping pain and tyenol and ibuprofen given PRN.  Bowel sounds audible and active x4. Pt. Stated she felt constipated and requested stool softener at HS. Order was obtained and pt received stool softener. Tolerating PO intake. Denied N/V. Voiding without difficulties.  Pt. Requested IV fluids to be completed. Discussed with midwife and new order to discontinue fluids. Educated parents of  to feed  8-12 times in 24 hours or estimated every 2-3 hours and to feed  on demand. Educated on hunger cues. Also, educated parents to wake  up if  sleeping and not arousing self or showing hunger cues.  Educated parents of  to wake up if they are sleeping and if needed to set an alarm. Parents verbalized understanding. Pt. Attentive to  and cares and bonding well. Significant Other at bedside and supportive.

## 2024-05-17 NOTE — DISCHARGE SUMMARY
OB Discharge Summary      Date:  2024    Name:  Jeremy Flores  :  1990  MRN:  [id number]      Admission Date:  5/15/2024  Delivery Date: 2024   Gestational Age at Delivery:  40w1d  Discharge Date:  2024    Principal Diagnosis:    Patient Active Problem List   Diagnosis    Amenorrhea    Thrombosed External Hemorrhoids    Pregnancy    Normal Routine History And Physical - Postpartum    Hyperemesis Gravidarum    Gynecologic Services Intrauterine Device (IUD) Checking    Stye    Nausea/vomiting in pregnancy    Pregnant    Encounter for triage in pregnant patient    Term pregnancy         Conditions complicating Pregnancy:   1) Persistent N/V without diagnosis of hyperemesis  2) GCT- 139  3) Anemia-10.1 on admission    Procedure(s) Performed:  Epidural, , AROM, abx for triple I    Indication for :  None  Indication for Induction:  None     Condition at Discharge:  Good    Discharge Medications:      Review of your medicines        START taking        Dose / Directions   ibuprofen 800 MG tablet  Commonly known as: ADVIL/MOTRIN  Used for:  (normal spontaneous vaginal delivery)      Dose: 800 mg  Take 1 tablet (800 mg) by mouth every 8 hours as needed for other or moderate pain (cramping)  Refills: 0            CONTINUE these medicines which have NOT CHANGED        Dose / Directions   acetaminophen 325 MG tablet  Commonly known as: TYLENOL  Used for:  (normal spontaneous vaginal delivery)      Dose: 325-650 mg  [ACETAMINOPHEN (TYLENOL) 325 MG TABLET] Take 1-2 tablets (325-650 mg total) by mouth every 4 (four) hours as needed.  Refills: 0     docusate sodium 100 MG capsule  Commonly known as: COLACE  Used for:  (normal spontaneous vaginal delivery)      Dose: 100 mg  [DOCUSATE SODIUM (COLACE) 100 MG CAPSULE] Take 1 capsule (100 mg total) by mouth daily.  Refills: 0               Where to get your medicines        Some of these will need a paper prescription and others can be bought  over the counter. Ask your nurse if you have questions.    You don't need a prescription for these medications  ibuprofen 800 MG tablet          Discharge Plan:    Follow up with /CHIARAM:  [facility] [place] in 6 weeks   Patient Instructions:      Physical activity: As tolerated. Nothing in the vagina for 6 weeks.     Diet:  Regular. Drink 100 oz (3 liters) daily.    Medication: As listed above. May alternate ibuprofen and acetaminophen for pain management.       Physician/CNM: TATE Miller CNM    Name:  Jeremy Flores  :  1990  MRN:  [id number]

## 2024-05-17 NOTE — PROGRESS NOTES
Per information written on provider white board on who is on-call, writer updated Lenora Thomas Midwife at 0645 regarding pt hemoglobin of 7.6. Pt. Is asymptomatic. Pt. Received one iron transfusion yesterday per orders and has another dose scheduled for today. Per Lenora Thomas, ordered 1 unit of PRBC per verbal order with read back. Lenora stated she is not on-call at this time and did not know about this patient status.     At 0655, writer notified Aga Alvarez who is the one on-call until 0800. Updated danay Lopezife of hemoglobin lab of 7.6 and also informed Aga MARCUS spoke with Lenora Thomas who ordered 1 PRBC x1.     Aga Alvarez cancelled order for 1 PRBC. Pt. Will continue with scheduled iron sucrose infusion.

## 2024-05-20 ENCOUNTER — PATIENT OUTREACH (OUTPATIENT)
Dept: CARE COORDINATION | Facility: CLINIC | Age: 34
End: 2024-05-20
Payer: COMMERCIAL

## 2024-05-20 NOTE — PROGRESS NOTES
Connected Care Resource Center:   [facility] Resource Center Contact  [facility]/Voicemail     Clinical Data: Post-Discharge Outreach     Outreach attempted x 2.  Left message on patient's voicemail, providing [facility]'s central phone number of [phone] ([phone]) for questions/concerns and/or to schedule an appt with an [facility] provider, if they do not have a PCP.      Plan:  [facility] will do no further outreaches at this time.       MARLIN Trevino  Connected Care Resource [place], [facility]    *Connected Care Resource Team does NOT follow patient ongoing. Referrals are identified based on internal discharge reports and the outreach is to ensure patient has an understanding of their discharge instructions.    
no

## 2024-12-05 ENCOUNTER — OFFICE VISIT (OUTPATIENT)
Dept: OBGYN | Facility: CLINIC | Age: 34
End: 2024-12-05
Attending: OBSTETRICS & GYNECOLOGY
Payer: COMMERCIAL

## 2024-12-05 VITALS
WEIGHT: 162 LBS | OXYGEN SATURATION: 96 % | HEART RATE: 72 BPM | HEIGHT: 61 IN | BODY MASS INDEX: 30.58 KG/M2 | DIASTOLIC BLOOD PRESSURE: 70 MMHG | SYSTOLIC BLOOD PRESSURE: 108 MMHG

## 2024-12-05 DIAGNOSIS — R35.0 URINARY FREQUENCY: ICD-10-CM

## 2024-12-05 DIAGNOSIS — Z01.419 WELL WOMAN EXAM: Primary | ICD-10-CM

## 2024-12-05 DIAGNOSIS — N94.3 PMS (PREMENSTRUAL SYNDROME): ICD-10-CM

## 2024-12-05 PROCEDURE — 99459 PELVIC EXAMINATION: CPT | Performed by: OBSTETRICS & GYNECOLOGY

## 2024-12-05 PROCEDURE — 99385 PREV VISIT NEW AGE 18-39: CPT | Performed by: OBSTETRICS & GYNECOLOGY

## 2024-12-05 PROCEDURE — 99213 OFFICE O/P EST LOW 20 MIN: CPT | Mod: 25 | Performed by: OBSTETRICS & GYNECOLOGY

## 2024-12-05 ASSESSMENT — ANXIETY QUESTIONNAIRES
7. FEELING AFRAID AS IF SOMETHING AWFUL MIGHT HAPPEN: SEVERAL DAYS
7. FEELING AFRAID AS IF SOMETHING AWFUL MIGHT HAPPEN: SEVERAL DAYS
6. BECOMING EASILY ANNOYED OR IRRITABLE: SEVERAL DAYS
GAD7 TOTAL SCORE: 6
GAD7 TOTAL SCORE: 6
IF YOU CHECKED OFF ANY PROBLEMS ON THIS QUESTIONNAIRE, HOW DIFFICULT HAVE THESE PROBLEMS MADE IT FOR YOU TO DO YOUR WORK, TAKE CARE OF THINGS AT HOME, OR GET ALONG WITH OTHER PEOPLE: SOMEWHAT DIFFICULT
2. NOT BEING ABLE TO STOP OR CONTROL WORRYING: SEVERAL DAYS
8. IF YOU CHECKED OFF ANY PROBLEMS, HOW DIFFICULT HAVE THESE MADE IT FOR YOU TO DO YOUR WORK, TAKE CARE OF THINGS AT HOME, OR GET ALONG WITH OTHER PEOPLE?: SOMEWHAT DIFFICULT
1. FEELING NERVOUS, ANXIOUS, OR ON EDGE: SEVERAL DAYS
5. BEING SO RESTLESS THAT IT IS HARD TO SIT STILL: NOT AT ALL
GAD7 TOTAL SCORE: 6
3. WORRYING TOO MUCH ABOUT DIFFERENT THINGS: SEVERAL DAYS
4. TROUBLE RELAXING: SEVERAL DAYS

## 2024-12-05 NOTE — PROGRESS NOTES
CC: Annual exam.    HPI: The pt is a 34 year old [facility]  who presents for an annual exam.  She wonders if she is having some delayed PP depression.  Her son was born 7 months ago with periods restarting in July.  Two of the 5 periods she's had have had intense mood changes.  She notes increased irritability, being short tempered, increased emotions, decreased sleep and appetite, all of which happen with her periods.  She had moderate to intense symptoms after her 4th baby as well.  She also feels different in her vaginal area after her last delivery, with increased urinary frequency.    Periods:  every month x4 days    Sexual Activity:  no issues    Last Pap:  5/15/23 NILM negative HRHPV    Mammogram:  NA    Colonoscopy:  NA    Exercise:  active, walking    Calcium/vitamin D:  some diet    Past Medical History:   Diagnosis Date    Abnormal Pap smear of cervix     Allergic     Gonorrhea     Migraine     Urinary tract infection     Varicella        Past Surgical History:   Procedure Laterality Date    APPENDECTOMY      2023    BIOPSY BREAST      COLPOSCOPY         Patient's   Family History   Problem Relation Age of Onset    Kidney Disease Mother     Diabetes Maternal Grandmother     Asthma Brother     Hearing Loss Brother     Hypertension Brother        Patient   Social History     Socioeconomic History    Marital status:      Spouse name: None    Number of children: 2    Years of education: None    Highest education level: None   Tobacco Use    Smoking status: Never     Passive exposure: Past    Smokeless tobacco: Never    Tobacco comments:      smokes outside   Substance and Sexual Activity    Alcohol use: No    Drug use: Never    Sexual activity: Yes     Partners: Male     Birth control/protection: None     Social Drivers of Health     Financial Resource Strain: Not At Risk (5/15/2023)    Received from [facility]    Financial Resource Strain     Is it hard for you to pay for the  "very basics like food, housing, medical care or heating?: No   Food Insecurity: Not At Risk (5/15/2023)    [facility], [facility]    Food Insecurity     Does your food run out before you have the money to buy more?: No   Transportation Needs: Not At Risk (5/15/2023)    Received from [facility], [facility]    Transportation Needs     Does a lack of transportation keep you from your medical appointments or from getting your medications?: No    Received from [facility], [facility]    Social Connections       Outpatient Medications Prior to Visit   Medication Sig Dispense Refill    VITAJOY BIOTIN GUMMIES PO Take by mouth.      acetaminophen (TYLENOL) 325 MG tablet [ACETAMINOPHEN (TYLENOL) 325 MG TABLET] Take 1-2 tablets (325-650 mg total) by mouth every 4 (four) hours as needed.  0    ibuprofen (ADVIL/MOTRIN) 800 MG tablet Take 1 tablet (800 mg) by mouth every 8 hours as needed for other or moderate pain (cramping)      docusate sodium (COLACE) 100 MG capsule [DOCUSATE SODIUM (COLACE) 100 MG CAPSULE] Take 1 capsule (100 mg total) by mouth daily. (Patient not taking: Reported on 12/5/2024)  0     No facility-administered medications prior to visit.       Patient is allergic to metronidazole.    ROS:  12 part ROS is negative aside from those symptoms in the HPI    PE:  /70 (BP Location: Right arm, Patient Position: Sitting, Cuff Size: Adult Regular)   Pulse 72   Ht 1.549 m (5' 1\")   Wt 73.5 kg (162 lb)   LMP 11/15/2024 (Exact Date)           Body mass index is 30.61 kg/m .    General: obese AF, NAD  Breast: breasts appear normal, no suspicious masses, no skin or nipple changes or axillary nodes  Abdomen: soft, NT/ND  Pelvic: EG/BUS no lesions, no skin change   Vagina no lesions, no discharge   Cervix no lesions, no cervical motion tenderness   Uterus AV, NT, mobile, no masses   Adnexa NT, no masses bilaterally, " mobile   Perineum no lesions   Rectal deferred  Extremities: no C/C/E, no lymphadenopathy, normal gait  Neurologic: CN I-XII grossly intact    Assessment: 34 year old MAF  with normal exam, PMS, urinary frequency    Plan: Pap smear due in 2028.  Mammogram starting at 40.  Exercise discussed with her.  Calcium/vitamin D recommendations discussed with her.  Contraception discussed.  We discussed the hormonal changes with her period and how they can affect mood.  She does not want medication at this time.  PT referral was placed for the urinary frequency.  Answers submitted by the patient for this visit:  Patient Health Questionnaire (G7) (Submitted on 12/5/2024)  LIZETH 7 TOTAL SCORE: 6

## 2025-04-28 NOTE — ANESTHESIA POSTPROCEDURE EVALUATION
Patient: Jeremy Flores  Anesthesia type: epidural    Patient location: Labor and Delivery  Last vitals:   Vitals:    09/16/18 1200   BP: 102/73   Pulse: 74   Resp: 16   Temp: 36.8  C (98.2  F)   SpO2:      Post vital signs: stable  Level of consciousness: awake and responds to simple questions  Post-anesthesia pain: pain controlled  Post-anesthesia nausea and vomiting: no  Pulmonary: unassisted, return to baseline  Cardiovascular: stable and blood pressure at baseline  Hydration: adequate  Anesthetic events: no    QCDR Measures:  ASA# 11 - Vesta-op Cardiac Arrest: ASA11B - Patient did NOT experience unanticipated cardiac arrest  ASA# 12 - Vesta-op Mortality Rate: ASA12B - Patient did NOT die  ASA# 13 - PACU Re-Intubation Rate: NA - No ETT / LMA used for case  ASA# 10 - Composite Anes Safety: ASA10A - No serious adverse event    Additional Notes:  Neuraxial block has worn off, no residual numbness or weakness. Pain controlled. Denies headache or back pain.     Have Your Skin Lesions Been Treated?: not been treated Is This A New Presentation, Or A Follow-Up?: Skin Lesions